# Patient Record
Sex: FEMALE | Race: WHITE | NOT HISPANIC OR LATINO
[De-identification: names, ages, dates, MRNs, and addresses within clinical notes are randomized per-mention and may not be internally consistent; named-entity substitution may affect disease eponyms.]

---

## 2018-09-21 ENCOUNTER — TRANSCRIPTION ENCOUNTER (OUTPATIENT)
Age: 83
End: 2018-09-21

## 2018-09-22 ENCOUNTER — RESULT REVIEW (OUTPATIENT)
Age: 83
End: 2018-09-22

## 2018-09-22 ENCOUNTER — INPATIENT (INPATIENT)
Facility: HOSPITAL | Age: 83
LOS: 4 days | Discharge: NURSING, REHAB OR LONG-TERM CARE | End: 2018-09-27
Attending: SURGERY | Admitting: SURGERY
Payer: MEDICARE

## 2018-09-22 VITALS — HEART RATE: 102 BPM | DIASTOLIC BLOOD PRESSURE: 80 MMHG | SYSTOLIC BLOOD PRESSURE: 131 MMHG | OXYGEN SATURATION: 100 %

## 2018-09-22 DIAGNOSIS — C49.A0 GASTROINTESTINAL STROMAL TUMOR, UNSPECIFIED SITE: Chronic | ICD-10-CM

## 2018-09-22 DIAGNOSIS — K63.1 PERFORATION OF INTESTINE (NONTRAUMATIC): ICD-10-CM

## 2018-09-22 LAB
ALBUMIN SERPL ELPH-MCNC: 2.7 G/DL — LOW (ref 3.3–5)
ALP SERPL-CCNC: 71 U/L — SIGNIFICANT CHANGE UP (ref 40–120)
ALT FLD-CCNC: 17 U/L — SIGNIFICANT CHANGE UP (ref 12–78)
ANION GAP SERPL CALC-SCNC: 10 MMOL/L — SIGNIFICANT CHANGE UP (ref 5–17)
APTT BLD: 46.7 SEC — HIGH (ref 27.5–37.4)
AST SERPL-CCNC: 18 U/L — SIGNIFICANT CHANGE UP (ref 15–37)
BASOPHILS # BLD AUTO: 0 K/UL — SIGNIFICANT CHANGE UP (ref 0–0.2)
BASOPHILS NFR BLD AUTO: 0 % — SIGNIFICANT CHANGE UP (ref 0–2)
BILIRUB SERPL-MCNC: 0.8 MG/DL — SIGNIFICANT CHANGE UP (ref 0.2–1.2)
BLD GP AB SCN SERPL QL: SIGNIFICANT CHANGE UP
BUN SERPL-MCNC: 30 MG/DL — HIGH (ref 7–23)
CALCIUM SERPL-MCNC: 7.8 MG/DL — LOW (ref 8.5–10.1)
CHLORIDE SERPL-SCNC: 104 MMOL/L — SIGNIFICANT CHANGE UP (ref 96–108)
CO2 SERPL-SCNC: 26 MMOL/L — SIGNIFICANT CHANGE UP (ref 22–31)
CREAT SERPL-MCNC: 1.1 MG/DL — SIGNIFICANT CHANGE UP (ref 0.5–1.3)
EOSINOPHIL # BLD AUTO: 0 K/UL — SIGNIFICANT CHANGE UP (ref 0–0.5)
EOSINOPHIL NFR BLD AUTO: 0 % — SIGNIFICANT CHANGE UP (ref 0–6)
GLUCOSE SERPL-MCNC: 120 MG/DL — HIGH (ref 70–99)
HCT VFR BLD CALC: 35.2 % — SIGNIFICANT CHANGE UP (ref 34.5–45)
HGB BLD-MCNC: 11.2 G/DL — LOW (ref 11.5–15.5)
INR BLD: 1.68 RATIO — HIGH (ref 0.88–1.16)
LACTATE SERPL-SCNC: 1.1 MMOL/L — SIGNIFICANT CHANGE UP (ref 0.7–2)
LACTATE SERPL-SCNC: 3 MMOL/L — HIGH (ref 0.7–2)
LIDOCAIN IGE QN: 42 U/L — LOW (ref 73–393)
LYMPHOCYTES # BLD AUTO: 0.26 K/UL — LOW (ref 1–3.3)
LYMPHOCYTES # BLD AUTO: 13 % — SIGNIFICANT CHANGE UP (ref 13–44)
MANUAL SMEAR VERIFICATION: SIGNIFICANT CHANGE UP
MCHC RBC-ENTMCNC: 30.8 PG — SIGNIFICANT CHANGE UP (ref 27–34)
MCHC RBC-ENTMCNC: 31.8 GM/DL — LOW (ref 32–36)
MCV RBC AUTO: 96.7 FL — SIGNIFICANT CHANGE UP (ref 80–100)
MONOCYTES # BLD AUTO: 0.18 K/UL — SIGNIFICANT CHANGE UP (ref 0–0.9)
MONOCYTES NFR BLD AUTO: 9 % — SIGNIFICANT CHANGE UP (ref 2–14)
NEUTROPHILS # BLD AUTO: 1.56 K/UL — LOW (ref 1.8–7.4)
NEUTROPHILS NFR BLD AUTO: 74 % — SIGNIFICANT CHANGE UP (ref 43–77)
NEUTS BAND # BLD: 4 % — SIGNIFICANT CHANGE UP (ref 0–8)
NRBC # BLD: 0 /100 — SIGNIFICANT CHANGE UP (ref 0–0)
NRBC # BLD: SIGNIFICANT CHANGE UP /100 WBCS (ref 0–0)
PLAT MORPH BLD: NORMAL — SIGNIFICANT CHANGE UP
PLATELET # BLD AUTO: 212 K/UL — SIGNIFICANT CHANGE UP (ref 150–400)
POTASSIUM SERPL-MCNC: 4.1 MMOL/L — SIGNIFICANT CHANGE UP (ref 3.5–5.3)
POTASSIUM SERPL-SCNC: 4.1 MMOL/L — SIGNIFICANT CHANGE UP (ref 3.5–5.3)
PROT SERPL-MCNC: 6.4 GM/DL — SIGNIFICANT CHANGE UP (ref 6–8.3)
PROTHROM AB SERPL-ACNC: 18.5 SEC — HIGH (ref 9.8–12.7)
RBC # BLD: 3.64 M/UL — LOW (ref 3.8–5.2)
RBC # FLD: 14 % — SIGNIFICANT CHANGE UP (ref 10.3–14.5)
RBC BLD AUTO: NORMAL — SIGNIFICANT CHANGE UP
SODIUM SERPL-SCNC: 140 MMOL/L — SIGNIFICANT CHANGE UP (ref 135–145)
SPHEROCYTES BLD QL SMEAR: SLIGHT — SIGNIFICANT CHANGE UP
WBC # BLD: 2 K/UL — LOW (ref 3.8–10.5)
WBC # FLD AUTO: 2 K/UL — LOW (ref 3.8–10.5)

## 2018-09-22 PROCEDURE — 44143 PARTIAL REMOVAL OF COLON: CPT | Mod: AS

## 2018-09-22 PROCEDURE — 88307 TISSUE EXAM BY PATHOLOGIST: CPT | Mod: 26

## 2018-09-22 PROCEDURE — 99285 EMERGENCY DEPT VISIT HI MDM: CPT

## 2018-09-22 PROCEDURE — 93010 ELECTROCARDIOGRAM REPORT: CPT

## 2018-09-22 PROCEDURE — 74177 CT ABD & PELVIS W/CONTRAST: CPT | Mod: 26

## 2018-09-22 PROCEDURE — 71045 X-RAY EXAM CHEST 1 VIEW: CPT | Mod: 26

## 2018-09-22 RX ORDER — SODIUM CHLORIDE 9 MG/ML
1000 INJECTION INTRAMUSCULAR; INTRAVENOUS; SUBCUTANEOUS ONCE
Qty: 0 | Refills: 0 | Status: COMPLETED | OUTPATIENT
Start: 2018-09-22 | End: 2018-09-22

## 2018-09-22 RX ORDER — MORPHINE SULFATE 50 MG/1
2 CAPSULE, EXTENDED RELEASE ORAL EVERY 4 HOURS
Qty: 0 | Refills: 0 | Status: DISCONTINUED | OUTPATIENT
Start: 2018-09-22 | End: 2018-09-22

## 2018-09-22 RX ORDER — SODIUM CHLORIDE 9 MG/ML
1000 INJECTION, SOLUTION INTRAVENOUS
Qty: 0 | Refills: 0 | Status: DISCONTINUED | OUTPATIENT
Start: 2018-09-22 | End: 2018-09-22

## 2018-09-22 RX ORDER — HEPARIN SODIUM 5000 [USP'U]/ML
5000 INJECTION INTRAVENOUS; SUBCUTANEOUS EVERY 12 HOURS
Qty: 0 | Refills: 0 | Status: DISCONTINUED | OUTPATIENT
Start: 2018-09-22 | End: 2018-09-22

## 2018-09-22 RX ORDER — ONDANSETRON 8 MG/1
4 TABLET, FILM COATED ORAL EVERY 6 HOURS
Qty: 0 | Refills: 0 | Status: DISCONTINUED | OUTPATIENT
Start: 2018-09-22 | End: 2018-09-22

## 2018-09-22 RX ORDER — PIPERACILLIN AND TAZOBACTAM 4; .5 G/20ML; G/20ML
3.38 INJECTION, POWDER, LYOPHILIZED, FOR SOLUTION INTRAVENOUS ONCE
Qty: 0 | Refills: 0 | Status: COMPLETED | OUTPATIENT
Start: 2018-09-22 | End: 2018-09-22

## 2018-09-22 RX ORDER — PANTOPRAZOLE SODIUM 20 MG/1
40 TABLET, DELAYED RELEASE ORAL DAILY
Qty: 0 | Refills: 0 | Status: DISCONTINUED | OUTPATIENT
Start: 2018-09-22 | End: 2018-09-22

## 2018-09-22 RX ORDER — METRONIDAZOLE 500 MG
500 TABLET ORAL ONCE
Qty: 0 | Refills: 0 | Status: COMPLETED | OUTPATIENT
Start: 2018-09-22 | End: 2018-09-22

## 2018-09-22 RX ORDER — PIPERACILLIN AND TAZOBACTAM 4; .5 G/20ML; G/20ML
3.38 INJECTION, POWDER, LYOPHILIZED, FOR SOLUTION INTRAVENOUS EVERY 8 HOURS
Qty: 0 | Refills: 0 | Status: DISCONTINUED | OUTPATIENT
Start: 2018-09-22 | End: 2018-09-22

## 2018-09-22 RX ADMIN — SODIUM CHLORIDE 100 MILLILITER(S): 9 INJECTION, SOLUTION INTRAVENOUS at 18:33

## 2018-09-22 RX ADMIN — SODIUM CHLORIDE 1000 MILLILITER(S): 9 INJECTION INTRAMUSCULAR; INTRAVENOUS; SUBCUTANEOUS at 14:30

## 2018-09-22 RX ADMIN — Medication 100 MILLIGRAM(S): at 17:18

## 2018-09-22 RX ADMIN — PIPERACILLIN AND TAZOBACTAM 200 GRAM(S): 4; .5 INJECTION, POWDER, LYOPHILIZED, FOR SOLUTION INTRAVENOUS at 18:33

## 2018-09-22 RX ADMIN — SODIUM CHLORIDE 1000 MILLILITER(S): 9 INJECTION INTRAMUSCULAR; INTRAVENOUS; SUBCUTANEOUS at 15:38

## 2018-09-22 RX ADMIN — SODIUM CHLORIDE 500 MILLILITER(S): 9 INJECTION INTRAMUSCULAR; INTRAVENOUS; SUBCUTANEOUS at 17:44

## 2018-09-22 NOTE — H&P ADULT - ATTENDING COMMENTS
I saw and examined the pt and discussed the tx plan with the House Staff. I agree with the exam and plan as documented in the above H&P.  Pt with abd pain and CT with free air c/w sigmoid perforation and in addition concerning for SB hyperenhancement - ? impending ischemia.  BP low on presentation, improved after IVFs, persistently tachycardic.  Recommended emergency ex-lap, poss bowel resection, possibe stoma creation, discussed details, R/B/A with pt and family, all questions answered.   Angela Garza MD

## 2018-09-22 NOTE — ED ADULT TRIAGE NOTE - CHIEF COMPLAINT QUOTE
brought by ems for abdominal pain . pt has been constipated for couple of days. pt took Miralax yesterday and now having diarrhea .

## 2018-09-22 NOTE — H&P ADULT - HISTORY OF PRESENT ILLNESS
85F with PMH RA, Depression/Anxiety, GIST tumor of stomach (s/p resection ~14yrs ago) presents c/o abdominal pain x 1 week. Patient states she has had mild abdominal pain x1 week, worsened this AM prompting arrival to the ER. Patient states pain is constant, diffuse, non radiating, no alleviating or worsening factors. Associated with nausea, denies vomiting. Admits to constipation x 1 week, which is not unusual for her. Last had a BM this morning, stool was watery. Denies hx of similar event.   Denies fever/chills, chest pain, sob, dizziness, urinary symptoms.

## 2018-09-22 NOTE — H&P ADULT - PROBLEM SELECTOR PLAN 1
Admit to surgery  NPO / IVF  Emergent OR for laparotomy   Zosyn  preop labs, T&S, coags  DVT ppx  Discussed with Dr. Garza

## 2018-09-22 NOTE — ED PROVIDER NOTE - MEDICAL DECISION MAKING DETAILS
pt with bowel perforation noted possibly from SBO evolution - pt with previous constipation 2 days ago, otherwise to admit for OR - pt needing bowel resection - discussed with family -will go to OR with Dr. Garza

## 2018-09-22 NOTE — ED PROVIDER NOTE - OBJECTIVE STATEMENT
85 year old female with PMH of of GIST tumor 85 year old female with PMH of of GIST tumor to stomach and otherwise with no significant medical hx presenting to ED due to 3 days of constipation - then had been using miralax and home with persistent abd pain now with diarrhea today 1 episode but severe pain on waking this AM. Currently pt states pain improved.

## 2018-09-22 NOTE — H&P ADULT - NSHPPHYSICALEXAM_GEN_ALL_CORE
General: Mild distress due to pain  Head: NC/AT  EENT: PERRLA. EOMI. Conjunctiva and sclera clear. Pharynx clear.  Neck: Supple.  Lungs: CTA B/l. Nonlabored Respirations  CV: +S1S2, tachycardic, regular   Abdomen: Distended, Quiet BS, 4 quadrant tenderness with guarding and rebound   Extremities: 2+ peripheral pulses b/l. Calf soft, nontender b/l. No pedal edema.   Neuro: A&Ox3.

## 2018-09-23 LAB
ALBUMIN SERPL ELPH-MCNC: 2.3 G/DL — LOW (ref 3.3–5)
ALP SERPL-CCNC: 61 U/L — SIGNIFICANT CHANGE UP (ref 40–120)
ALT FLD-CCNC: 17 U/L — SIGNIFICANT CHANGE UP (ref 12–78)
ANION GAP SERPL CALC-SCNC: 7 MMOL/L — SIGNIFICANT CHANGE UP (ref 5–17)
ANION GAP SERPL CALC-SCNC: 9 MMOL/L — SIGNIFICANT CHANGE UP (ref 5–17)
AST SERPL-CCNC: 16 U/L — SIGNIFICANT CHANGE UP (ref 15–37)
BASE EXCESS BLDA CALC-SCNC: -2.3 MMOL/L — LOW (ref -2–2)
BASE EXCESS BLDA CALC-SCNC: 0.2 MMOL/L — SIGNIFICANT CHANGE UP (ref -2–2)
BASOPHILS # BLD AUTO: 0.03 K/UL — SIGNIFICANT CHANGE UP (ref 0–0.2)
BASOPHILS NFR BLD AUTO: 0.8 % — SIGNIFICANT CHANGE UP (ref 0–2)
BILIRUB SERPL-MCNC: 0.9 MG/DL — SIGNIFICANT CHANGE UP (ref 0.2–1.2)
BLOOD GAS COMMENTS: SIGNIFICANT CHANGE UP
BLOOD GAS SOURCE: SIGNIFICANT CHANGE UP
BLOOD GAS SOURCE: SIGNIFICANT CHANGE UP
BUN SERPL-MCNC: 19 MG/DL — SIGNIFICANT CHANGE UP (ref 7–23)
BUN SERPL-MCNC: 24 MG/DL — HIGH (ref 7–23)
CALCIUM SERPL-MCNC: 6.7 MG/DL — LOW (ref 8.5–10.1)
CALCIUM SERPL-MCNC: 7 MG/DL — LOW (ref 8.5–10.1)
CHLORIDE SERPL-SCNC: 107 MMOL/L — SIGNIFICANT CHANGE UP (ref 96–108)
CHLORIDE SERPL-SCNC: 111 MMOL/L — HIGH (ref 96–108)
CO2 SERPL-SCNC: 25 MMOL/L — SIGNIFICANT CHANGE UP (ref 22–31)
CO2 SERPL-SCNC: 25 MMOL/L — SIGNIFICANT CHANGE UP (ref 22–31)
CREAT SERPL-MCNC: 0.62 MG/DL — SIGNIFICANT CHANGE UP (ref 0.5–1.3)
CREAT SERPL-MCNC: 0.65 MG/DL — SIGNIFICANT CHANGE UP (ref 0.5–1.3)
EOSINOPHIL # BLD AUTO: 0 K/UL — SIGNIFICANT CHANGE UP (ref 0–0.5)
EOSINOPHIL NFR BLD AUTO: 0 % — SIGNIFICANT CHANGE UP (ref 0–6)
GLUCOSE BLDC GLUCOMTR-MCNC: 107 MG/DL — HIGH (ref 70–99)
GLUCOSE BLDC GLUCOMTR-MCNC: 118 MG/DL — HIGH (ref 70–99)
GLUCOSE BLDC GLUCOMTR-MCNC: 126 MG/DL — HIGH (ref 70–99)
GLUCOSE SERPL-MCNC: 108 MG/DL — HIGH (ref 70–99)
GLUCOSE SERPL-MCNC: 126 MG/DL — HIGH (ref 70–99)
GRAM STN FLD: SIGNIFICANT CHANGE UP
GRAM STN FLD: SIGNIFICANT CHANGE UP
HCO3 BLDA-SCNC: 23 MMOL/L — SIGNIFICANT CHANGE UP (ref 21–29)
HCO3 BLDA-SCNC: 24 MMOL/L — SIGNIFICANT CHANGE UP (ref 21–29)
HCT VFR BLD CALC: 31.5 % — LOW (ref 34.5–45)
HCT VFR BLD CALC: 32.6 % — LOW (ref 34.5–45)
HGB BLD-MCNC: 10 G/DL — LOW (ref 11.5–15.5)
HGB BLD-MCNC: 10.5 G/DL — LOW (ref 11.5–15.5)
HOROWITZ INDEX BLDA+IHG-RTO: 0.3 — SIGNIFICANT CHANGE UP
HOROWITZ INDEX BLDA+IHG-RTO: 30 — SIGNIFICANT CHANGE UP
IMM GRANULOCYTES NFR BLD AUTO: 0.5 % — SIGNIFICANT CHANGE UP (ref 0–1.5)
INR BLD: 1.28 RATIO — HIGH (ref 0.88–1.16)
LACTATE SERPL-SCNC: 0.8 MMOL/L — SIGNIFICANT CHANGE UP (ref 0.7–2)
LYMPHOCYTES # BLD AUTO: 0.46 K/UL — LOW (ref 1–3.3)
LYMPHOCYTES # BLD AUTO: 12 % — LOW (ref 13–44)
MAGNESIUM SERPL-MCNC: 1.8 MG/DL — SIGNIFICANT CHANGE UP (ref 1.6–2.6)
MCHC RBC-ENTMCNC: 31.1 PG — SIGNIFICANT CHANGE UP (ref 27–34)
MCHC RBC-ENTMCNC: 31.4 PG — SIGNIFICANT CHANGE UP (ref 27–34)
MCHC RBC-ENTMCNC: 31.7 GM/DL — LOW (ref 32–36)
MCHC RBC-ENTMCNC: 32.2 GM/DL — SIGNIFICANT CHANGE UP (ref 32–36)
MCV RBC AUTO: 97.6 FL — SIGNIFICANT CHANGE UP (ref 80–100)
MCV RBC AUTO: 97.8 FL — SIGNIFICANT CHANGE UP (ref 80–100)
MONOCYTES # BLD AUTO: 0.14 K/UL — SIGNIFICANT CHANGE UP (ref 0–0.9)
MONOCYTES NFR BLD AUTO: 3.7 % — SIGNIFICANT CHANGE UP (ref 2–14)
NEUTROPHILS # BLD AUTO: 3.17 K/UL — SIGNIFICANT CHANGE UP (ref 1.8–7.4)
NEUTROPHILS NFR BLD AUTO: 83 % — HIGH (ref 43–77)
NRBC # BLD: 0 /100 WBCS — SIGNIFICANT CHANGE UP (ref 0–0)
NRBC # BLD: 0 /100 WBCS — SIGNIFICANT CHANGE UP (ref 0–0)
PCO2 BLDA: 35 MMHG — SIGNIFICANT CHANGE UP (ref 32–46)
PCO2 BLDA: 48 MMHG — HIGH (ref 32–46)
PH BLD: 7.31 — LOW (ref 7.35–7.45)
PH BLD: 7.44 — SIGNIFICANT CHANGE UP (ref 7.35–7.45)
PHOSPHATE SERPL-MCNC: 2.5 MG/DL — SIGNIFICANT CHANGE UP (ref 2.5–4.5)
PLATELET # BLD AUTO: 101 K/UL — LOW (ref 150–400)
PLATELET # BLD AUTO: 170 K/UL — SIGNIFICANT CHANGE UP (ref 150–400)
PO2 BLDA: 112 MMHG — HIGH (ref 74–108)
PO2 BLDA: 138 MMHG — HIGH (ref 74–108)
POTASSIUM SERPL-MCNC: 3.6 MMOL/L — SIGNIFICANT CHANGE UP (ref 3.5–5.3)
POTASSIUM SERPL-MCNC: 4.7 MMOL/L — SIGNIFICANT CHANGE UP (ref 3.5–5.3)
POTASSIUM SERPL-SCNC: 3.6 MMOL/L — SIGNIFICANT CHANGE UP (ref 3.5–5.3)
POTASSIUM SERPL-SCNC: 4.7 MMOL/L — SIGNIFICANT CHANGE UP (ref 3.5–5.3)
PROT SERPL-MCNC: 5.5 GM/DL — LOW (ref 6–8.3)
PROTHROM AB SERPL-ACNC: 14 SEC — HIGH (ref 9.8–12.7)
RBC # BLD: 3.22 M/UL — LOW (ref 3.8–5.2)
RBC # BLD: 3.34 M/UL — LOW (ref 3.8–5.2)
RBC # FLD: 13.9 % — SIGNIFICANT CHANGE UP (ref 10.3–14.5)
RBC # FLD: 14.2 % — SIGNIFICANT CHANGE UP (ref 10.3–14.5)
SAO2 % BLDA: 98 % — HIGH (ref 92–96)
SAO2 % BLDA: 99 % — HIGH (ref 92–96)
SODIUM SERPL-SCNC: 141 MMOL/L — SIGNIFICANT CHANGE UP (ref 135–145)
SODIUM SERPL-SCNC: 143 MMOL/L — SIGNIFICANT CHANGE UP (ref 135–145)
SPECIMEN SOURCE: SIGNIFICANT CHANGE UP
SPECIMEN SOURCE: SIGNIFICANT CHANGE UP
WBC # BLD: 3.82 K/UL — SIGNIFICANT CHANGE UP (ref 3.8–10.5)
WBC # BLD: 7.57 K/UL — SIGNIFICANT CHANGE UP (ref 3.8–10.5)
WBC # FLD AUTO: 3.82 K/UL — SIGNIFICANT CHANGE UP (ref 3.8–10.5)
WBC # FLD AUTO: 7.57 K/UL — SIGNIFICANT CHANGE UP (ref 3.8–10.5)

## 2018-09-23 PROCEDURE — 71045 X-RAY EXAM CHEST 1 VIEW: CPT | Mod: 26

## 2018-09-23 PROCEDURE — 99291 CRITICAL CARE FIRST HOUR: CPT

## 2018-09-23 RX ORDER — CHLORHEXIDINE GLUCONATE 213 G/1000ML
1 SOLUTION TOPICAL DAILY
Qty: 0 | Refills: 0 | Status: DISCONTINUED | OUTPATIENT
Start: 2018-09-23 | End: 2018-09-23

## 2018-09-23 RX ORDER — SODIUM CHLORIDE 9 MG/ML
1000 INJECTION, SOLUTION INTRAVENOUS
Qty: 0 | Refills: 0 | Status: DISCONTINUED | OUTPATIENT
Start: 2018-09-23 | End: 2018-09-24

## 2018-09-23 RX ORDER — VANCOMYCIN HCL 1 G
750 VIAL (EA) INTRAVENOUS EVERY 12 HOURS
Qty: 0 | Refills: 0 | Status: DISCONTINUED | OUTPATIENT
Start: 2018-09-23 | End: 2018-09-25

## 2018-09-23 RX ORDER — PIPERACILLIN AND TAZOBACTAM 4; .5 G/20ML; G/20ML
3.38 INJECTION, POWDER, LYOPHILIZED, FOR SOLUTION INTRAVENOUS EVERY 8 HOURS
Qty: 0 | Refills: 0 | Status: DISCONTINUED | OUTPATIENT
Start: 2018-09-23 | End: 2018-09-27

## 2018-09-23 RX ORDER — CHLORHEXIDINE GLUCONATE 213 G/1000ML
15 SOLUTION TOPICAL
Qty: 0 | Refills: 0 | Status: DISCONTINUED | OUTPATIENT
Start: 2018-09-23 | End: 2018-09-23

## 2018-09-23 RX ORDER — DEXMEDETOMIDINE HYDROCHLORIDE IN 0.9% SODIUM CHLORIDE 4 UG/ML
0.3 INJECTION INTRAVENOUS
Qty: 200 | Refills: 0 | Status: DISCONTINUED | OUTPATIENT
Start: 2018-09-23 | End: 2018-09-23

## 2018-09-23 RX ORDER — CHLORHEXIDINE GLUCONATE 213 G/1000ML
1 SOLUTION TOPICAL
Qty: 0 | Refills: 0 | Status: DISCONTINUED | OUTPATIENT
Start: 2018-09-23 | End: 2018-09-25

## 2018-09-23 RX ORDER — NOREPINEPHRINE BITARTRATE/D5W 8 MG/250ML
0.05 PLASTIC BAG, INJECTION (ML) INTRAVENOUS
Qty: 8 | Refills: 0 | Status: DISCONTINUED | OUTPATIENT
Start: 2018-09-23 | End: 2018-09-24

## 2018-09-23 RX ORDER — VANCOMYCIN HCL 1 G
750 VIAL (EA) INTRAVENOUS ONCE
Qty: 0 | Refills: 0 | Status: COMPLETED | OUTPATIENT
Start: 2018-09-23 | End: 2018-09-23

## 2018-09-23 RX ORDER — FLUCONAZOLE 150 MG/1
TABLET ORAL
Qty: 0 | Refills: 0 | Status: DISCONTINUED | OUTPATIENT
Start: 2018-09-23 | End: 2018-09-26

## 2018-09-23 RX ORDER — FLUCONAZOLE 150 MG/1
100 TABLET ORAL ONCE
Qty: 0 | Refills: 0 | Status: COMPLETED | OUTPATIENT
Start: 2018-09-23 | End: 2018-09-23

## 2018-09-23 RX ORDER — PANTOPRAZOLE SODIUM 20 MG/1
40 TABLET, DELAYED RELEASE ORAL DAILY
Qty: 0 | Refills: 0 | Status: DISCONTINUED | OUTPATIENT
Start: 2018-09-23 | End: 2018-09-23

## 2018-09-23 RX ORDER — INFLUENZA VIRUS VACCINE 15; 15; 15; 15 UG/.5ML; UG/.5ML; UG/.5ML; UG/.5ML
0.5 SUSPENSION INTRAMUSCULAR ONCE
Qty: 0 | Refills: 0 | Status: DISCONTINUED | OUTPATIENT
Start: 2018-09-23 | End: 2018-09-27

## 2018-09-23 RX ORDER — FLUCONAZOLE 150 MG/1
100 TABLET ORAL EVERY 24 HOURS
Qty: 0 | Refills: 0 | Status: DISCONTINUED | OUTPATIENT
Start: 2018-09-24 | End: 2018-09-26

## 2018-09-23 RX ORDER — MORPHINE SULFATE 50 MG/1
2 CAPSULE, EXTENDED RELEASE ORAL EVERY 4 HOURS
Qty: 0 | Refills: 0 | Status: DISCONTINUED | OUTPATIENT
Start: 2018-09-23 | End: 2018-09-24

## 2018-09-23 RX ORDER — HEPARIN SODIUM 5000 [USP'U]/ML
5000 INJECTION INTRAVENOUS; SUBCUTANEOUS EVERY 12 HOURS
Qty: 0 | Refills: 0 | Status: DISCONTINUED | OUTPATIENT
Start: 2018-09-23 | End: 2018-09-23

## 2018-09-23 RX ORDER — SODIUM CHLORIDE 9 MG/ML
500 INJECTION, SOLUTION INTRAVENOUS ONCE
Qty: 0 | Refills: 0 | Status: COMPLETED | OUTPATIENT
Start: 2018-09-23 | End: 2018-09-23

## 2018-09-23 RX ADMIN — CHLORHEXIDINE GLUCONATE 15 MILLILITER(S): 213 SOLUTION TOPICAL at 05:47

## 2018-09-23 RX ADMIN — PIPERACILLIN AND TAZOBACTAM 25 GRAM(S): 4; .5 INJECTION, POWDER, LYOPHILIZED, FOR SOLUTION INTRAVENOUS at 13:21

## 2018-09-23 RX ADMIN — DEXMEDETOMIDINE HYDROCHLORIDE IN 0.9% SODIUM CHLORIDE 3.06 MICROGRAM(S)/KG/HR: 4 INJECTION INTRAVENOUS at 06:09

## 2018-09-23 RX ADMIN — FLUCONAZOLE 50 MILLIGRAM(S): 150 TABLET ORAL at 09:05

## 2018-09-23 RX ADMIN — CHLORHEXIDINE GLUCONATE 1 APPLICATION(S): 213 SOLUTION TOPICAL at 11:47

## 2018-09-23 RX ADMIN — PIPERACILLIN AND TAZOBACTAM 25 GRAM(S): 4; .5 INJECTION, POWDER, LYOPHILIZED, FOR SOLUTION INTRAVENOUS at 22:05

## 2018-09-23 RX ADMIN — SODIUM CHLORIDE 100 MILLILITER(S): 9 INJECTION, SOLUTION INTRAVENOUS at 13:20

## 2018-09-23 RX ADMIN — SODIUM CHLORIDE 100 MILLILITER(S): 9 INJECTION, SOLUTION INTRAVENOUS at 00:30

## 2018-09-23 RX ADMIN — SODIUM CHLORIDE 500 MILLILITER(S): 9 INJECTION, SOLUTION INTRAVENOUS at 11:30

## 2018-09-23 RX ADMIN — PIPERACILLIN AND TAZOBACTAM 25 GRAM(S): 4; .5 INJECTION, POWDER, LYOPHILIZED, FOR SOLUTION INTRAVENOUS at 05:00

## 2018-09-23 RX ADMIN — SODIUM CHLORIDE 500 MILLILITER(S): 9 INJECTION, SOLUTION INTRAVENOUS at 08:30

## 2018-09-23 RX ADMIN — Medication 250 MILLIGRAM(S): at 02:00

## 2018-09-23 RX ADMIN — SODIUM CHLORIDE 100 MILLILITER(S): 9 INJECTION, SOLUTION INTRAVENOUS at 23:34

## 2018-09-23 RX ADMIN — Medication 3.83 MICROGRAM(S)/KG/MIN: at 10:36

## 2018-09-23 RX ADMIN — Medication 250 MILLIGRAM(S): at 17:27

## 2018-09-23 RX ADMIN — PANTOPRAZOLE SODIUM 40 MILLIGRAM(S): 20 TABLET, DELAYED RELEASE ORAL at 11:28

## 2018-09-23 NOTE — PROGRESS NOTE ADULT - ASSESSMENT
85F with PMH RA, Depression/Anxiety, GIST tumor of stomach (s/p resection ~14yrs ago) arrived with perforated sigmoid colon likely 2/2 diverticulitis, now s/p OR for ex-lap with abdominal washout, sigmoid resection and Silva's procedure.      Neuro: Intubated and sedated, currently on Precedex for sedation, tolerating weaning trial well.  Will wean to extubate.  Morphine 2mg PRN for pain.   CV: Hypotensive, possibly vasoplegic requiring IVF and now on levophed.  Will give LR bolus 500cc.  Reassess and monitor urine output closely.   Pulm: Intubated for OR, weaning today.  Will extubate this morning if passes sbt.  repeat abg after sbt is ABG  pH, Blood: 7.44  /  pCO2: 35    /  pO2: 138   / HCO3: 23    / Base Excess: 0.2   /  SaO2: 99; ok to extubate  GI: NPO for now.  Follow up surgery, ostomy appears well vitalized.  NGT in place.    Renal/Metabolic: Monitor i and o closely; follow up creatinine.    ID: Zosyn and fluconazole for peritonitis and perforated diverticulitis.  Continue to monitor.    Heme: H/H stable, leukopenia, continue to trend at this time.    Endo: No acute issues   Dispo: Remains critically ill, weaning ventilator currently and on vasopressors for vasoplegia likely 2/2 septic shock.      critical care time spent 45 minutes.

## 2018-09-23 NOTE — BRIEF OPERATIVE NOTE - PROCEDURE
<<-----Click on this checkbox to enter Procedure Lebron's procedure  09/23/2018    Active  GRIFFIN  Sigmoid resection  09/23/2018    Active  GRIFFIN  Peritoneal lavage  09/23/2018    Active  GRIFFIN

## 2018-09-23 NOTE — PROVIDER CONTACT NOTE (CRITICAL VALUE NOTIFICATION) - TEST AND RESULT REPORTED:
body fluids collected 9/22- rare gram negative rods per oil power field, rare positive cocci in pairs per oil power fied, moderate poly morphonuclear leukocytes seen per low power field; body fluids collected 9/22- few gram positive rods per oil power field, moderate gram positive cocci in pairs per oil power field, moderate gram negative rods per oil power field; numerous polymorphnuclear leukocytes seen per lower power field

## 2018-09-23 NOTE — PROGRESS NOTE ADULT - SUBJECTIVE AND OBJECTIVE BOX
INTERVAL HPI/OVERNIGHT EVENTS:      85F with PMH RA, Depression/Anxiety, GIST tumor of stomach (s/p resection ~14yrs ago) arrived with perforated sigmoid colon likely 2/2 diverticulitis, now s/p OR for ex-lap with abdominal washout, sigmoid resection and Silva's procedure.      24 hour events: Noted to be hypotensive this AM with decreased urine output, started on IV pressors.  Weaning well this morning.      CENTRAL LINE: [ ] YES [ x] NO  LOCATION:   DATE INSERTED:  REMOVE: [ ] YES [ ] NO  EXPLAIN:    MAYS: [ x] YES [ ] NO    DATE INSERTED: 9/23/18  REMOVE:  [ ] YES [ ] NO  EXPLAIN:    A-LINE:  [ ] YES [x ] NO  LOCATION:   DATE INSERTED:  REMOVE:  [ ] YES [ ] NO  EXPLAIN:    REVIEW OF SYSTEMS: [x] Unable to obtain because: intubated on SBT    ICU Vital Signs Last 24 Hrs  T(C): 36.6 (23 Sep 2018 07:40), Max: 37.3 (23 Sep 2018 00:15)  T(F): 97.9 (23 Sep 2018 07:40), Max: 99.2 (23 Sep 2018 00:15)  HR: 91 (23 Sep 2018 11:00) (91 - 118)  BP: 87/60 (23 Sep 2018 11:00) (86/61 - 131/67)  BP(mean): 66 (23 Sep 2018 11:00) (60 - 86)  ABP: --  ABP(mean): --  RR: 10 (23 Sep 2018 11:00) (10 - 21)  SpO2: 100% (23 Sep 2018 11:00) (98% - 100%)      ABG - ( 23 Sep 2018 11:00 )  pH, Arterial: x     pH, Blood: 7.44  /  pCO2: 35    /  pO2: 138   / HCO3: 23    / Base Excess: 0.2   /  SaO2: 99        I&O's Detail    22 Sep 2018 07:01  -  23 Sep 2018 07:00  --------------------------------------------------------  IN:    dexmedetomidine Infusion: 6 mL    lactated ringers.: 800 mL    Solution: 250 mL    Solution: 100 mL  Total IN: 1156 mL    OUT:    Indwelling Catheter - Urethral: 380 mL    Nasoenteral Tube: 50 mL  Total OUT: 430 mL    Total NET: 726 mL      23 Sep 2018 07:01  -  23 Sep 2018 11:20  --------------------------------------------------------  IN:    dexmedetomidine Infusion: 3.1 mL    Lactated Ringers IV Bolus: 500 mL    lactated ringers.: 400 mL    norepinephrine Infusion: 7.5 mL    Solution: 50 mL  Total IN: 960.6 mL    OUT:    Indwelling Catheter - Urethral: 125 mL  Total OUT: 125 mL    Total NET: 835.6 mL    Mode: CPAP with PS  FiO2: 30  PEEP: 5  PS: 5  MAP: 7  PIP: 11    CAPILLARY BLOOD GLUCOSE      POCT Blood Glucose.: 107 mg/dL (23 Sep 2018 07:33)  POCT Blood Glucose.: 126 mg/dL (23 Sep 2018 00:09)      MEDICATIONS  NEURO  Meds: dexmedetomidine Infusion 0.3 MICROgram(s)/kG/Hr (3.06 mL/Hr) IV Continuous <Continuous>  morphine  - Injectable 2 milliGRAM(s) IV Push every 4 hours PRN Moderate Pain (4 - 6)    RESPIRATORY  ABG - ( 23 Sep 2018 11:00 )  pH: x     /  pCO2: 35    /  pO2: 138   / HCO3: 23    / Base Excess: 0.2   /  SaO2: 99      Lactate: x        Meds:   CARDIOVASCULAR  Meds: norepinephrine Infusion 0.05 MICROgram(s)/kG/Min (3.825 mL/Hr) IV Continuous <Continuous>    GI/NUTRITION  Meds: pantoprazole  Injectable 40 milliGRAM(s) IV Push daily    GENITOURINARY  Meds: lactated ringers Bolus 500 milliLiter(s) IV Bolus once  lactated ringers. 1000 milliLiter(s) IV Continuous <Continuous>    HEMATOLOGIC  Meds: heparin  Injectable 5000 Unit(s) SubCutaneous every 12 hours    [x] VTE Prophylaxis  INFECTIOUS DISEASES  Meds: fluconAZOLE IVPB      piperacillin/tazobactam IVPB. 3.375 Gram(s) IV Intermittent every 8 hours    ENDOCRINE  CAPILLARY BLOOD GLUCOSE      POCT Blood Glucose.: 107 mg/dL (23 Sep 2018 07:33)  POCT Blood Glucose.: 126 mg/dL (23 Sep 2018 00:09)    Meds:   OTHER MEDICATIONS:  chlorhexidine 0.12% Liquid 15 milliLiter(s) Swish and Spit two times a day  chlorhexidine 2% Cloths 1 Application(s) Topical daily  :    PHYSICAL EXAM:    GENERAL: NAD, intubated weaning well  HEAD:  Atraumatic, Normocephalic  NECK: Supple, No JVD, Normal thyroid  CHEST/LUNG: Clear to auscultation bilaterally; No rales, rhonchi, wheezing, or rubs  HEART: Regular rate and rhythm; No murmurs, rubs, or gallops  ABDOMEN: Soft, tender to palpation; colostomy in place, wound c/d/i  EXTREMITIES:  2+ Peripheral Pulses, No clubbing, cyanosis, or edema  NERVOUS SYSTEM:  Alert & Oriented, moving all extremities, no focal neuro deficits.    LABS:                        10.0   3.82  )-----------( 170      ( 23 Sep 2018 01:07 )             31.5      09-23    143  |  111<H>  |  24<H>  ----------------------------<  126<H>  3.6   |  25  |  0.65    Ca    6.7<L>      23 Sep 2018 01:07  Phos  2.5     09-23  Mg     1.8     09-23    TPro  5.5<L>  /  Alb  2.3<L>  /  TBili  0.9  /  DBili  x   /  AST  16  /  ALT  17  /  AlkPhos  61  09-23    PT/INR - ( 23 Sep 2018 01:06 )   PT: 14.0 sec;   INR: 1.28 ratio         PTT - ( 22 Sep 2018 17:15 )  PTT:46.7 sec    RADIOLOGY & ADDITIONAL STUDIES:

## 2018-09-23 NOTE — PROGRESS NOTE ADULT - SUBJECTIVE AND OBJECTIVE BOX
Surgery NP note  Patient seen and examined bedside in CCU, intubated and sedated, NO pressor support  No acute events overnight     T(F): 98.1 (09-23-18 @ 05:51), Max: 99.2 (09-23-18 @ 00:15)  HR: 103 (09-23-18 @ 06:30) (97 - 118)  BP: 114/66 (09-23-18 @ 06:30) (88/69 - 131/67)  RR: 19 (09-23-18 @ 06:30) (12 - 21)  SpO2: 100% (09-23-18 @ 06:30) (98% - 100%)  Wt(kg): --  CAPILLARY BLOOD GLUCOSE      POCT Blood Glucose.: 126 mg/dL (23 Sep 2018 00:09)      PHYSICAL EXAM:  General: NAD,frail   Neuro: Sedated  HEENT: NCAT, EOMI, conjunctiva clear, NGT with bilious output 50ml/in last 6 hrs  CV: +S1+S2 regular rate and rhythm  Lung: clear to ausculation bilaterally, respirations nonlabored, good inspiratory effort  Abdomen: soft, No Distension. Normal active BS Ostomy pink and viable, no air ni bag, midline incision with DSD - minimal s/s drainage  Extremities: no pedal edema noted   : Mata with clear yellow urine 380ml/last 6 hours  LABS:                        10.0   3.82  )-----------( 170      ( 23 Sep 2018 01:07 )             31.5     09-23    143  |  111<H>  |  24<H>  ----------------------------<  126<H>  3.6   |  25  |  0.65    Ca    6.7<L>      23 Sep 2018 01:07  Phos  2.5     09-23  Mg     1.8     09-23    TPro  5.5<L>  /  Alb  2.3<L>  /  TBili  0.9  /  DBili  x   /  AST  16  /  ALT  17  /  AlkPhos  61  09-23    LIVER FUNCTIONS - ( 23 Sep 2018 01:07 )  Alb: 2.3 g/dL / Pro: 5.5 gm/dL / ALK PHOS: 61 U/L / ALT: 17 U/L / AST: 16 U/L / GGT: x           PT/INR - ( 23 Sep 2018 01:06 )   PT: 14.0 sec;   INR: 1.28 ratio         PTT - ( 22 Sep 2018 17:15 )  PTT:46.7 sec  I&O's Detail    22 Sep 2018 07:01  -  23 Sep 2018 06:51  --------------------------------------------------------  IN:    dexmedetomidine Infusion: 3 mL    lactated ringers.: 700 mL    Solution: 250 mL    Solution: 100 mL  Total IN: 1053 mL    OUT:    Indwelling Catheter - Urethral: 380 mL    Nasoenteral Tube: 50 mL  Total OUT: 430 mL    Total NET: 623 mL            Impression: 85y Female admitted with Abdominal pain S/P Exploratory Laparotomy Sigmoid resection with Hartmans procedure, Peritoneal lavage   secondary to Sigmoid perforation, Diffuse peritonitis   PMH Rheumatoid arthritis      Plan:  - Will remove Packing later today   - Continue Antibiotics, f/u OR culture  - Continue NPO/IVF, NGT to Suction, Mata  - Continue VTE prophylaxis with SQ heparin to start this am  - Continue CCU management  - f/u AM labs  - will discuss with surgical attending for Recommendations Surgery NP note  Patient seen and examined bedside in CCU, intubated and sedated, NO pressor support  No acute events overnight     T(F): 98.1 (09-23-18 @ 05:51), Max: 99.2 (09-23-18 @ 00:15)  HR: 103 (09-23-18 @ 06:30) (97 - 118)  BP: 114/66 (09-23-18 @ 06:30) (88/69 - 131/67)  RR: 19 (09-23-18 @ 06:30) (12 - 21)  SpO2: 100% (09-23-18 @ 06:30) (98% - 100%)  Wt(kg): --  CAPILLARY BLOOD GLUCOSE      POCT Blood Glucose.: 126 mg/dL (23 Sep 2018 00:09)      PHYSICAL EXAM:  General: NAD,frail   Neuro: Sedated  HEENT: NCAT, EOMI, conjunctiva clear, NGT with bilious output 50ml/in last 6 hrs  CV: +S1+S2 regular rate and rhythm  Lung: clear to ausculation bilaterally, respirations nonlabored, good inspiratory effort  Abdomen: soft, No Distension. Normal active BS Ostomy pink and viable, no air ni bag, midline incision with DSD - minimal s/s drainage  Extremities: no pedal edema noted   : Mata with clear yellow urine 380ml/last 6 hours  LABS:                        10.0   3.82  )-----------( 170      ( 23 Sep 2018 01:07 )             31.5     09-23    143  |  111<H>  |  24<H>  ----------------------------<  126<H>  3.6   |  25  |  0.65    Ca    6.7<L>      23 Sep 2018 01:07  Phos  2.5     09-23  Mg     1.8     09-23    TPro  5.5<L>  /  Alb  2.3<L>  /  TBili  0.9  /  DBili  x   /  AST  16  /  ALT  17  /  AlkPhos  61  09-23    LIVER FUNCTIONS - ( 23 Sep 2018 01:07 )  Alb: 2.3 g/dL / Pro: 5.5 gm/dL / ALK PHOS: 61 U/L / ALT: 17 U/L / AST: 16 U/L / GGT: x           PT/INR - ( 23 Sep 2018 01:06 )   PT: 14.0 sec;   INR: 1.28 ratio         PTT - ( 22 Sep 2018 17:15 )  PTT:46.7 sec  I&O's Detail    22 Sep 2018 07:01  -  23 Sep 2018 06:51  --------------------------------------------------------  IN:    dexmedetomidine Infusion: 3 mL    lactated ringers.: 700 mL    Solution: 250 mL    Solution: 100 mL  Total IN: 1053 mL    OUT:    Indwelling Catheter - Urethral: 380 mL    Nasoenteral Tube: 50 mL  Total OUT: 430 mL    Total NET: 623 mL            Impression: 85y Female admitted with Abdominal pain S/P Exploratory Laparotomy Sigmoid resection with Hartmans procedure, Peritoneal lavage   secondary to Sigmoid perforation (likely perforated diverticulitis), Diffuse purulent peritonitis   PMH Rheumatoid arthritis      Plan:  - Will remove Packing later today   - Continue Antibiotics, f/u OR culture  - Continue NPO/IVF, NGT to Suction, Mata  - Continue VTE prophylaxis with SQ heparin to start this am  - Continue CCU management  - f/u AM labs  - will discuss with surgical attending for Recommendations

## 2018-09-23 NOTE — CHART NOTE - NSCHARTNOTEFT_GEN_A_CORE
HPI:  85F with PMH RA, Depression/Anxiety, GIST tumor of stomach (s/p resection ~14yrs ago) presents c/o abdominal pain x 1 week. Patient states she has had mild abdominal pain x1 week, worsened this AM prompting arrival to the ER. Patient states pain is constant, diffuse, non radiating, no alleviating or worsening factors. Associated with nausea, denies vomiting. Admits to constipation x 1 week, which is not unusual for her. Last had a BM this morning, stool was watery. Denies hx of similar event.   Denies fever/chills, chest pain, sob, dizziness, urinary symptoms. (22 Sep 2018 18:11) Ct Scan revealed  Free intraperitoneal air and fluid is identified consistent   with bowel perforation. In light of pathologic thickening of the mid sigmoid colon and stranding of the pelvic mesentery the possibility for sigmoid   colonic perforation is raised; however, diffuse small bowel wall thickening with extensive mesenteric edema as well as a segment of small bowel within   the left lower quadrant demonstrating less robust enhancement raises the possibility for small bowel ischemia with secondary perforation.    Patient received postoperatively s/p exploratory laparotomy, Lebron's resection procedure and lysis of adhesions. Patient received 2U PRBCs. bp 130/70    Vital Signs Last 24 Hrs  T(C): 37.3 (23 Sep 2018 00:15), Max: 37.3 (23 Sep 2018 00:15)  T(F): 99.2 (23 Sep 2018 00:15), Max: 99.2 (23 Sep 2018 00:15)  HR: 110 (23 Sep 2018 01:00) (103 - 118)  BP: 117/59 (23 Sep 2018 01:00) (92/66 - 123/76)  BP(mean): 73 (23 Sep 2018 01:00) (73 - 86)  RR: 12 (23 Sep 2018 01:00) (12 - 18)  SpO2: 99% (23 Sep 2018 01:00) (98% -     Gen sedated, thin appearing female younger than stated age  EENT: orally intubated ETT 7.5 at 21 cm lip. Bilateral air movement not appreciated. Chest Xray revealed ETT at [place] retracted to 19 cm  Lungs bilateral air entry  Cardiac S1/S2, systolic murmus 2/5 at 2nd RICS  Abdomen: colostomy site intact scant bleeding  : Mata  400 cc clear yellow urine  Ext no edema    Labs Drawn Lab Results:  CBC Full  -  ( 23 Sep 2018 01:07 )  WBC Count : 3.82 K/uL  Hemoglobin : 10.0 g/dL  Hematocrit : 31.5 %  Platelet Count - Automated : 170 K/uL  Mean Cell Volume : 97.8 fl  Mean Cell Hemoglobin : 31.1 pg  Mean Cell Hemoglobin Concentration : 31.7 gm/dL  Auto Neutrophil # : 3.17 K/uL  Auto Lymphocyte # : 0.46 K/uL  Auto Monocyte # : 0.14 K/uL  Auto Eosinophil # : 0.00 K/uL  Auto Basophil # : 0.03 K/uL  Auto Neutrophil % : 83.0 %  Auto Lymphocyte % : 12.0 %  Auto Monocyte % : 3.7 %  Auto Eosinophil % : 0.0 %  Auto Basophil % : 0.8 %    .		Differential:	[] Automated		[] Manual  Chemistry                        10.0   3.82  )-----------( 170      ( 23 Sep 2018 01:07 )             31.5     09-23    143  |  111<H>  |  24<H>  ----------------------------<  126<H>  3.6   |  25  |  0.65    Ca    6.7<L>      23 Sep 2018 01:07    TPro  5.5<L>  /  Alb  2.3<L>  /  TBili  0.9  /  DBili  x   /  AST  16  /  ALT  17  /  AlkPhos  61  09-23    LIVER FUNCTIONS - ( 23 Sep 2018 01:07 )  Alb: 2.3 g/dL / Pro: 5.5 gm/dL / ALK PHOS: 61 U/L / ALT: 17 U/L / AST: 16 U/L / GGT: x           PT/INR - ( 23 Sep 2018 01:06 )   PT: 14.0 sec;   INR: 1.28 ratio         PTT - ( 22 Sep 2018 17:15 )  PTT:46.7 sec      ABG - ( 23 Sep 2018 00:51 )  pH, Arterial: x     pH, Blood: 7.31  /  pCO2: 48    /  pO2: 112   / HCO3: 24    / Base Excess: -2.3  /  SaO2: 98          Post operative Sigmoid Perforation, r/o bacterial peritonitis  ID: vancomycin 750 mg IVPB      continue Zosyn  GI: monitor operative site; NGT to LWS  Heme Hold heparin 24 hours. Pneumatic Teds  Resp: PRVV 300/16/30/+5  GI prophylaxasis

## 2018-09-24 DIAGNOSIS — E87.6 HYPOKALEMIA: ICD-10-CM

## 2018-09-24 DIAGNOSIS — E83.39 OTHER DISORDERS OF PHOSPHORUS METABOLISM: ICD-10-CM

## 2018-09-24 DIAGNOSIS — D62 ACUTE POSTHEMORRHAGIC ANEMIA: ICD-10-CM

## 2018-09-24 DIAGNOSIS — E43 UNSPECIFIED SEVERE PROTEIN-CALORIE MALNUTRITION: ICD-10-CM

## 2018-09-24 LAB
ANION GAP SERPL CALC-SCNC: 10 MMOL/L — SIGNIFICANT CHANGE UP (ref 5–17)
BUN SERPL-MCNC: 15 MG/DL — SIGNIFICANT CHANGE UP (ref 7–23)
CALCIUM SERPL-MCNC: 6.8 MG/DL — LOW (ref 8.5–10.1)
CHLORIDE SERPL-SCNC: 106 MMOL/L — SIGNIFICANT CHANGE UP (ref 96–108)
CO2 SERPL-SCNC: 25 MMOL/L — SIGNIFICANT CHANGE UP (ref 22–31)
CREAT SERPL-MCNC: 0.53 MG/DL — SIGNIFICANT CHANGE UP (ref 0.5–1.3)
CULTURE RESULTS: NO GROWTH — SIGNIFICANT CHANGE UP
GLUCOSE BLDC GLUCOMTR-MCNC: 109 MG/DL — HIGH (ref 70–99)
GLUCOSE SERPL-MCNC: 94 MG/DL — SIGNIFICANT CHANGE UP (ref 70–99)
HCT VFR BLD CALC: 29.8 % — LOW (ref 34.5–45)
HGB BLD-MCNC: 9.4 G/DL — LOW (ref 11.5–15.5)
MAGNESIUM SERPL-MCNC: 1.7 MG/DL — SIGNIFICANT CHANGE UP (ref 1.6–2.6)
MCHC RBC-ENTMCNC: 30.6 PG — SIGNIFICANT CHANGE UP (ref 27–34)
MCHC RBC-ENTMCNC: 31.5 GM/DL — LOW (ref 32–36)
MCV RBC AUTO: 97.1 FL — SIGNIFICANT CHANGE UP (ref 80–100)
NRBC # BLD: 0 /100 WBCS — SIGNIFICANT CHANGE UP (ref 0–0)
PHOSPHATE SERPL-MCNC: 1.5 MG/DL — LOW (ref 2.5–4.5)
PLATELET # BLD AUTO: 189 K/UL — SIGNIFICANT CHANGE UP (ref 150–400)
POTASSIUM SERPL-MCNC: 3.4 MMOL/L — LOW (ref 3.5–5.3)
POTASSIUM SERPL-SCNC: 3.4 MMOL/L — LOW (ref 3.5–5.3)
RBC # BLD: 3.07 M/UL — LOW (ref 3.8–5.2)
RBC # FLD: 14.3 % — SIGNIFICANT CHANGE UP (ref 10.3–14.5)
SODIUM SERPL-SCNC: 141 MMOL/L — SIGNIFICANT CHANGE UP (ref 135–145)
SPECIMEN SOURCE: SIGNIFICANT CHANGE UP
WBC # BLD: 7.66 K/UL — SIGNIFICANT CHANGE UP (ref 3.8–10.5)
WBC # FLD AUTO: 7.66 K/UL — SIGNIFICANT CHANGE UP (ref 3.8–10.5)

## 2018-09-24 PROCEDURE — 99232 SBSQ HOSP IP/OBS MODERATE 35: CPT

## 2018-09-24 PROCEDURE — 99223 1ST HOSP IP/OBS HIGH 75: CPT

## 2018-09-24 RX ORDER — ONDANSETRON 8 MG/1
4 TABLET, FILM COATED ORAL EVERY 6 HOURS
Qty: 0 | Refills: 0 | Status: DISCONTINUED | OUTPATIENT
Start: 2018-09-24 | End: 2018-09-27

## 2018-09-24 RX ORDER — HEPARIN SODIUM 5000 [USP'U]/ML
5000 INJECTION INTRAVENOUS; SUBCUTANEOUS EVERY 12 HOURS
Qty: 0 | Refills: 0 | Status: DISCONTINUED | OUTPATIENT
Start: 2018-09-24 | End: 2018-09-27

## 2018-09-24 RX ORDER — PANTOPRAZOLE SODIUM 20 MG/1
40 TABLET, DELAYED RELEASE ORAL DAILY
Qty: 0 | Refills: 0 | Status: DISCONTINUED | OUTPATIENT
Start: 2018-09-24 | End: 2018-09-27

## 2018-09-24 RX ORDER — OXYCODONE AND ACETAMINOPHEN 5; 325 MG/1; MG/1
1 TABLET ORAL EVERY 4 HOURS
Qty: 0 | Refills: 0 | Status: DISCONTINUED | OUTPATIENT
Start: 2018-09-24 | End: 2018-09-27

## 2018-09-24 RX ORDER — MORPHINE SULFATE 50 MG/1
2 CAPSULE, EXTENDED RELEASE ORAL EVERY 4 HOURS
Qty: 0 | Refills: 0 | Status: DISCONTINUED | OUTPATIENT
Start: 2018-09-24 | End: 2018-09-27

## 2018-09-24 RX ORDER — ACETAMINOPHEN 500 MG
650 TABLET ORAL EVERY 6 HOURS
Qty: 0 | Refills: 0 | Status: DISCONTINUED | OUTPATIENT
Start: 2018-09-24 | End: 2018-09-27

## 2018-09-24 RX ORDER — SODIUM CHLORIDE 9 MG/ML
1000 INJECTION, SOLUTION INTRAVENOUS
Qty: 0 | Refills: 0 | Status: DISCONTINUED | OUTPATIENT
Start: 2018-09-24 | End: 2018-09-27

## 2018-09-24 RX ORDER — POTASSIUM PHOSPHATE, MONOBASIC POTASSIUM PHOSPHATE, DIBASIC 236; 224 MG/ML; MG/ML
15 INJECTION, SOLUTION INTRAVENOUS ONCE
Qty: 0 | Refills: 0 | Status: COMPLETED | OUTPATIENT
Start: 2018-09-24 | End: 2018-09-24

## 2018-09-24 RX ADMIN — POTASSIUM PHOSPHATE, MONOBASIC POTASSIUM PHOSPHATE, DIBASIC 63.75 MILLIMOLE(S): 236; 224 INJECTION, SOLUTION INTRAVENOUS at 07:31

## 2018-09-24 RX ADMIN — PIPERACILLIN AND TAZOBACTAM 25 GRAM(S): 4; .5 INJECTION, POWDER, LYOPHILIZED, FOR SOLUTION INTRAVENOUS at 14:05

## 2018-09-24 RX ADMIN — SODIUM CHLORIDE 50 MILLILITER(S): 9 INJECTION, SOLUTION INTRAVENOUS at 10:06

## 2018-09-24 RX ADMIN — PIPERACILLIN AND TAZOBACTAM 25 GRAM(S): 4; .5 INJECTION, POWDER, LYOPHILIZED, FOR SOLUTION INTRAVENOUS at 21:53

## 2018-09-24 RX ADMIN — PIPERACILLIN AND TAZOBACTAM 25 GRAM(S): 4; .5 INJECTION, POWDER, LYOPHILIZED, FOR SOLUTION INTRAVENOUS at 05:46

## 2018-09-24 RX ADMIN — FLUCONAZOLE 50 MILLIGRAM(S): 150 TABLET ORAL at 09:00

## 2018-09-24 RX ADMIN — Medication 250 MILLIGRAM(S): at 17:38

## 2018-09-24 RX ADMIN — CHLORHEXIDINE GLUCONATE 1 APPLICATION(S): 213 SOLUTION TOPICAL at 05:47

## 2018-09-24 RX ADMIN — HEPARIN SODIUM 5000 UNIT(S): 5000 INJECTION INTRAVENOUS; SUBCUTANEOUS at 17:27

## 2018-09-24 RX ADMIN — Medication 250 MILLIGRAM(S): at 05:46

## 2018-09-24 NOTE — DIETITIAN INITIAL EVALUATION ADULT. - ETIOLOGY
Inadequate energy & protein intake related to severe sepsis with septic shock & sigmoid bowel perforation

## 2018-09-24 NOTE — PROGRESS NOTE ADULT - SUBJECTIVE AND OBJECTIVE BOX
HPI:  85F with PMH RA, Depression/Anxiety, GIST tumor of stomach (s/p resection ~14yrs ago) presents c/o abdominal pain x 1 week. Patient states she has had mild abdominal pain x1 week, worsened this AM prompting arrival to the ER. Patient states pain is constant, diffuse, non radiating, no alleviating or worsening factors. Associated with nausea, denies vomiting. Admits to constipation x 1 week, which is not unusual for her. Last had a BM this morning, stool was watery. Denies hx of similar event.   Denies fever/chills, chest pain, sob, dizziness, urinary symptoms. (22 Sep 2018 18:11)      24 hr events: Weaned off pressors. No acute events. Feels well this morning. Has ostomy output. No chest or abdominal pain. No dyspnea. No fevers.    ## ROS:  See above. ROS otherwise negative.    ## Vitals  ICU Vital Signs Last 24 Hrs  T(C): 35.8 (24 Sep 2018 08:00), Max: 36.8 (23 Sep 2018 19:17)  T(F): 96.4 (24 Sep 2018 08:00), Max: 98.3 (23 Sep 2018 19:17)  HR: 93 (24 Sep 2018 09:00) (90 - 134)  BP: 107/58 (24 Sep 2018 09:00) (73/52 - 139/55)  BP(mean): 70 (24 Sep 2018 09:00) (57 - 99)  ABP: --  ABP(mean): --  RR: 21 (24 Sep 2018 09:00) (10 - 40)  SpO2: 99% (24 Sep 2018 09:00) (85% - 100%)      ## Physical Exam:  Gen: Elderly female lying in bed, NAD  HEENT: NC/AT sclerae anicteric  Resp: No increased WOB. CTAB  CV: S1, S2  Abd: Soft, Ostomy bag w/stool in it. Surgical wounds dressed. + BS  Ext: WWP  Neuro: Awake, alert, follows commands    ## Vent Data  Mode: nasal cannula 2lpm  FiO2: 28      ## Labs:  Chem:  09-24    141  |  106  |  15  ----------------------------<  94  3.4<L>   |  25  |  0.53    Ca    6.8<L>      24 Sep 2018 03:34  Phos  1.5     09-24  Mg     1.7     09-24    TPro  5.5<L>  /  Alb  2.3<L>  /  TBili  0.9  /  DBili  x   /  AST  16  /  ALT  17  /  AlkPhos  61  09-23    LIVER FUNCTIONS - ( 23 Sep 2018 01:07 )  Alb: 2.3 g/dL / Pro: 5.5 gm/dL / ALK PHOS: 61 U/L / ALT: 17 U/L / AST: 16 U/L / GGT: x           CBC:                        9.4    7.66  )-----------( 189      ( 24 Sep 2018 03:34 )             29.8     Coags:  PT/INR - ( 23 Sep 2018 01:06 )   PT: 14.0 sec;   INR: 1.28 ratio         PTT - ( 22 Sep 2018 17:15 )  PTT:46.7 sec        ## Cardiac        ## Blood Gas  ABG - ( 23 Sep 2018 11:00 )  pH, Arterial: x     pH, Blood: 7.44  /  pCO2: 35    /  pO2: 138   / HCO3: 23    / Base Excess: 0.2   /  SaO2: 99                  #I/Os  I&O's Detail    23 Sep 2018 07:01  -  24 Sep 2018 07:00  --------------------------------------------------------  IN:    dexmedetomidine Infusion: 3.1 mL    Lactated Ringers IV Bolus: 1000 mL    lactated ringers.: 2300 mL    norepinephrine Infusion: 41.6 mL    Solution: 300 mL    Solution: 500 mL    Solution: 50 mL  Total IN: 4194.7 mL    OUT:    Colostomy: 35 mL    Indwelling Catheter - Urethral: 675 mL    Nasoenteral Tube: 150 mL  Total OUT: 860 mL    Total NET: 3334.7 mL      24 Sep 2018 07:01  -  24 Sep 2018 09:50  --------------------------------------------------------  IN:    lactated ringers.: 100 mL  Total IN: 100 mL    OUT:    Indwelling Catheter - Urethral: 50 mL  Total OUT: 50 mL    Total NET: 50 mL          ## Imaging:    ## Medications:  MEDICATIONS  (STANDING):  chlorhexidine 4% Liquid 1 Application(s) Topical <User Schedule>  dextrose 5%. 1000 milliLiter(s) (50 mL/Hr) IV Continuous <Continuous>  fluconAZOLE IVPB 100 milliGRAM(s) IV Intermittent every 24 hours  fluconAZOLE IVPB      heparin  Injectable 5000 Unit(s) SubCutaneous every 12 hours  influenza   Vaccine 0.5 milliLiter(s) IntraMuscular once  piperacillin/tazobactam IVPB. 3.375 Gram(s) IV Intermittent every 8 hours  vancomycin  IVPB 750 milliGRAM(s) IV Intermittent every 12 hours    MEDICATIONS  (PRN):  morphine  - Injectable 2 milliGRAM(s) IV Push every 4 hours PRN Moderate Pain (4 - 6)

## 2018-09-24 NOTE — CONSULT NOTE ADULT - SUBJECTIVE AND OBJECTIVE BOX
Infectious Diseases - Attending at Dr. Kilgore    HPI:  85F with PMH RA, Depression/Anxiety, GIST tumor of stomach (s/p resection ~14yrs ago) presents c/o abdominal pain x 1 week. Patient states she has had mild abdominal pain x1 week, worsened this AM prompting arrival to the ER. Patient states pain is constant, diffuse, non radiating, no alleviating or worsening factors. Associated with nausea, denies vomiting. Admits to constipation x 1 week, which is not unusual for her. Last had a BM this morning, stool was watery. Denies hx of similar event.   Denies fever/chills, chest pain, sob, dizziness, urinary symptoms. (22 Sep 2018 18:11)  Pt now s/p Silva procedure for diverticulitis with perforation with peritonitis .On vanco/zosyn and diflucan .Improving .Awaiting transfer to medical bed      PAST MEDICAL & SURGICAL HISTORY:  Rheumatoid arthritis  Gastrointestinal stromal tumor (GIST)      Allergies    No Known Allergies    Intolerances        FAMILY HISTORY:  No pertinent family history in first degree relatives      SOCIAL HISTORY: non smoker  REVIEW OF SYSTEMS:    Constitutional: No fever, weight loss or+ fatigue  Eyes: No eye pain, visual disturbances, or discharge  ENT:  No difficulty hearing, tinnitus, vertigo; No sinus or throat pain  Neck: No pain or stiffness  Respiratory: No cough, wheezing, chills or hemoptysis  Cardiovascular: No chest pain, palpitations, shortness of breath, dizziness or leg swelling  Gastrointestinal: abdominal pain +,No nausea, vomiting or hematemesis; No diarrhea or constipation. No melena or hematochezia.  Genitourinary: No dysuria, frequency, hematuria or incontinence  Neurological: No headaches, memory loss, loss of strength, numbness or tremors  Skin: No itching, burning, rashes or lesions       MEDICATIONS  (STANDING):  carbidopa/levodopa  25/100 1 Tablet(s) Oral <User Schedule>  dextrose 5%. 1000 milliLiter(s) (50 mL/Hr) IV Continuous <Continuous>  escitalopram 5 milliGRAM(s) Oral daily  fluconAZOLE IVPB 100 milliGRAM(s) IV Intermittent every 24 hours  fluconAZOLE IVPB      heparin  Injectable 5000 Unit(s) SubCutaneous every 12 hours  hydroxychloroquine 200 milliGRAM(s) Oral daily  influenza   Vaccine 0.5 milliLiter(s) IntraMuscular once  mirtazapine 15 milliGRAM(s) Oral at bedtime  pantoprazole  Injectable 40 milliGRAM(s) IV Push daily  piperacillin/tazobactam IVPB. 3.375 Gram(s) IV Intermittent every 8 hours  vancomycin  IVPB 750 milliGRAM(s) IV Intermittent every 12 hours    MEDICATIONS  (PRN):  acetaminophen   Tablet .. 650 milliGRAM(s) Oral every 6 hours PRN Temp greater or equal to 38C (100.4F), Mild Pain (1 - 3)  morphine  - Injectable 2 milliGRAM(s) IV Push every 4 hours PRN Severe Pain (7 - 10)  ondansetron Injectable 4 milliGRAM(s) IV Push every 6 hours PRN Nausea and/or Vomiting  oxyCODONE    5 mG/acetaminophen 325 mG 1 Tablet(s) Oral every 4 hours PRN Moderate Pain (4 - 6)      Vital Signs Last 24 Hrs  Tmax :afebrile  HR: 61  BP: 120/86   RR: 16  SpO2: 98%     PHYSICAL EXAM:    Constitutional: NAD, well-groomed, well-developed  HEENT: PERRLA, EOMI, Normal Hearing, MMM  Neck: No LAD, No JVD  Back: Normal spine flexure, No CVA tenderness  Respiratory: CTAB/L  Cardiovascular: S1 and S2, RRR, no M/G/R  Gastrointestinal: s/p exlap with silva procedure ,colostomy bag+  Extremities: No peripheral edema  Vascular: 2+ peripheral pulses  Neurological: A/O x 3, no focal deficits  Skin: No rashes      LABS:              WBC 2-->7.66  cr  wnl          MICROBIOLOGY:  RECENT CULTURES:  09-23 .Urine Clean Catch (Midstream) XXXX XXXX   No growth    09-23 Abdominal Fl intra abdominal fluid c/s Aeromonas hydrophila/caviae  Klebsiella pneumoniae  Escherichia coli   Rare Gram Negative Rods per oil power field  Rare Gram positive cocci in pairs per oil power field  Moderate polymorphonuclear leukocytes seen per low power field   Few Klebsiella pneumoniae  Few Escherichia coli  Few Aeromonas hydrophila/caviae    09-22 .Blood Blood-Peripheral XXXX XXXX   No growth to date.          RADIOLOGY & ADDITIONAL STUDIES:  < from: CT Abdomen and Pelvis w/ IV Cont (09.22.18 @ 16:15) >    IMPRESSION:  Free intraperitoneal air and fluid is identified consistent   with bowel perforation. In light of pathologic thickening of the mid   sigmoid colon and stranding of the pelvic mesentery the possibility for   sigmoid colonic perforation is raised; however, diffuse small bowel wall   thickening with extensive mesenteric edema as well as a segment of small   bowel within the left lower quadrant demonstrating less robust   enhancement raises the possibility for small bowel ischemia with   secondary perforation    < end of copied text >
HPI:  85F with PMH RA, Depression/Anxiety, GIST tumor of stomach (s/p resection ~14yrs ago) presents c/o abdominal pain x 1 week. Patient states she has had mild abdominal pain x1 week, worsened this AM prompting arrival to the ER. Patient states pain is constant, diffuse, non radiating, no alleviating or worsening factors. Associated with nausea, denies vomiting. Admits to constipation x 1 week, which is not unusual for her. Last had a BM this morning, stool was watery. Denies hx of similar event.   Denies fever/chills, chest pain, sob, dizziness, urinary symptoms. (22 Sep 2018 18:11)      PAST MEDICAL & SURGICAL HISTORY:  Rheumatoid arthritis  Gastrointestinal stromal tumor (GIST)      REVIEW OF SYSTEMS:    CONSTITUTIONAL: No fever, weight loss, or fatigue  EYES: No eye pain, visual disturbances, or discharge  ENMT:  No difficulty hearing, tinnitus, vertigo; No sinus or throat pain  NECK: No pain or stiffness  BREASTS: No pain, masses, or nipple discharge  RESPIRATORY: No cough, wheezing, chills or hemoptysis; No shortness of breath  CARDIOVASCULAR: No chest pain, palpitations, dizziness, or leg swelling  GASTROINTESTINAL: No abdominal or epigastric pain. No nausea, vomiting, or hematemesis; No diarrhea or constipation. No melena or hematochezia.  GENITOURINARY: No dysuria, frequency, hematuria, or incontinence  NEUROLOGICAL: No headaches, memory loss, loss of strength, numbness, or tremors  SKIN: No itching, burning, rashes, or lesions   LYMPH NODES: No enlarged glands  ENDOCRINE: No heat or cold intolerance; No hair loss  MUSCULOSKELETAL: No joint pain or swelling; No muscle, back, or extremity pain  PSYCHIATRIC: No depression, anxiety, mood swings, or difficulty sleeping  HEME/LYMPH: No easy bruising, or bleeding gums  ALLERGY AND IMMUNOLOGIC: No hives or eczema      MEDICATIONS  (STANDING):  chlorhexidine 4% Liquid 1 Application(s) Topical <User Schedule>  dextrose 5%. 1000 milliLiter(s) (50 mL/Hr) IV Continuous <Continuous>  fluconAZOLE IVPB 100 milliGRAM(s) IV Intermittent every 24 hours  fluconAZOLE IVPB      heparin  Injectable 5000 Unit(s) SubCutaneous every 12 hours  influenza   Vaccine 0.5 milliLiter(s) IntraMuscular once  piperacillin/tazobactam IVPB. 3.375 Gram(s) IV Intermittent every 8 hours  vancomycin  IVPB 750 milliGRAM(s) IV Intermittent every 12 hours    MEDICATIONS  (PRN):  morphine  - Injectable 2 milliGRAM(s) IV Push every 4 hours PRN Moderate Pain (4 - 6)      Allergies    No Known Allergies    Intolerances        SOCIAL HISTORY:    FAMILY HISTORY:  No pertinent family history in first degree relatives      Vital Signs Last 24 Hrs  T(C): 36.1 (24 Sep 2018 15:28), Max: 36.8 (23 Sep 2018 19:17)  T(F): 97 (24 Sep 2018 15:28), Max: 98.3 (23 Sep 2018 19:17)  HR: 94 (24 Sep 2018 16:00) (78 - 126)  BP: 122/63 (24 Sep 2018 16:00) (73/52 - 125/55)  BP(mean): 78 (24 Sep 2018 16:00) (57 - 89)  RR: 31 (24 Sep 2018 16:00) (12 - 40)  SpO2: 96% (24 Sep 2018 16:00) (85% - 100%)    PHYSICAL EXAM:    GENERAL: NAD, well-groomed, well-developed, frail elderly female   HEAD:  Atraumatic, Normocephalic  EYES: EOMI, PERRLA, conjunctiva and sclera clear  ENMT: No tonsillar erythema, exudates, or enlargement; Moist mucous membranes, Good dentition, No lesions  NECK: Supple, No JVD, Normal thyroid  NERVOUS SYSTEM:  Alert & Oriented X3, Good concentration; Motor Strength 5/5 B/L upper and lower extremities; DTRs 2+ intact and symmetric  CHEST/LUNG: Clear to percussion bilaterally; No rales, rhonchi, wheezing, or rubs  HEART: Regular rate and rhythm; No murmurs, rubs, or gallops  ABDOMEN: Soft, Ostomy bag w/stool in it. Surgical wounds dressed. + BS  EXTREMITIES:  2+ Peripheral Pulses, No clubbing, cyanosis, or edema  LYMPH: No lymphadenopathy noted         LABS:                        9.4    7.66  )-----------( 189      ( 24 Sep 2018 03:34 )             29.8     09-24    141  |  106  |  15  ----------------------------<  94  3.4<L>   |  25  |  0.53    Ca    6.8<L>      24 Sep 2018 03:34  Phos  1.5     09-24  Mg     1.7     09-24    TPro  5.5<L>  /  Alb  2.3<L>  /  TBili  0.9  /  DBili  x   /  AST  16  /  ALT  17  /  AlkPhos  61  09-23    PT/INR - ( 23 Sep 2018 01:06 )   PT: 14.0 sec;   INR: 1.28 ratio         PTT - ( 22 Sep 2018 17:15 )  PTT:46.7 sec      RADIOLOGY & ADDITIONAL STUDIES:

## 2018-09-24 NOTE — DIETITIAN INITIAL EVALUATION ADULT. - NS AS NUTRI INTERV MEALS SNACK
Fat - modified diet/Fiber - modified diet/Recommend advance po diet when medically feasible Clear liquids to full liquids to Low fiber/Low fat

## 2018-09-24 NOTE — PROGRESS NOTE ADULT - ASSESSMENT
84 y/o F w/RA, history of GIST of stomach s/p resection admitted for perforated sigmoid colon c/b severe sepsis with septic shock, now s/p ex-LAP w/washout, sigmoid resection, and chung's, doing well postoperatively. Shock now resolved.     - NPO, D5 gtt  - Continue abx/antifungals  - Replete K for hypokalemia, replete phos for hypophosphatemia  - List to floor 84 y/o F w/RA, history of GIST of stomach s/p resection admitted for perforated sigmoid colon c/b severe sepsis with septic shock, now s/p ex-LAP w/washout, sigmoid resection, and chung's, doing well postoperatively. Shock now resolved.     - NPO, D5 gtt  - Continue abx/antifungals  - Replete K for hypokalemia, replete phos for hypophosphatemia  - Post operative care as per surgical team  - List to floor

## 2018-09-24 NOTE — CONSULT NOTE ADULT - ASSESSMENT
85 yr old female was admitted with abdominal pain and found to have bowel perforation with sigmoid diverticulitis ,underwent emergent exlap with chung procedure .C/s growing gram negative and aeromonas .Pt on vanco zosyn and empiric diflucan   continues to do ok ,seen today with
85y Female with PMH: RA,  admitted with Abdominal pain 1 S/P Exploratory Laparotomy Sigmoid resection with Hartmans procedure, Peritoneal lavage   secondary to Sigmoid perforation (likely perforated diverticulitis) found to have diffuse purulent peritonitis.  Pt on BS IV abx and antifungal treatment.  Pt extubated successfully to NC yesterday and tolerating NC.  Pt transiently on pressors yesterday likely secondary to septic shock.   Will defer to surgery re: tube feeds vs PO intake of meds/diet.

## 2018-09-24 NOTE — CHART NOTE - NSCHARTNOTEFT_GEN_A_CORE
Pt medically stable for transfer to medical floor.     85y Female with PMH: RA,  admitted with Abdominal pain 1 S/P Exploratory Laparotomy Sigmoid resection with Hartmans procedure, Peritoneal lavage   secondary to Sigmoid perforation (likely perforated diverticulitis) found to have diffuse purulent peritonitis.  Pt on BS IV abx and antifungal treatment.  Pt extubated successfully to NC yesterday and tolerating NC.  Pt transiently on pressors yesterday likely secondary to septic shock.   Will defer to surgery re: tube feeds vs PO intake of meds/diet.  Consider ID consult. Pt medically stable for transfer to medical floor.  Signed out to PA Behan.      85y Female with PMH: RA,  admitted with Abdominal pain 1 S/P Exploratory Laparotomy Sigmoid resection with Hartmans procedure, Peritoneal lavage   secondary to Sigmoid perforation (likely perforated diverticulitis) found to have diffuse purulent peritonitis.  Pt on BS IV abx and antifungal treatment.  Pt extubated successfully to NC yesterday and tolerating NC.  Pt transiently on pressors yesterday likely secondary to septic shock.   Will defer to surgery re: tube feeds vs PO intake of meds/diet.  Will call ID (Dr. Brown) and medicine (hospitalist) consults. Pt medically stable for transfer to medical floor.  Signed out to PA Behan.      85y Female with PMH: RA,  admitted with Abdominal pain 1 S/P Exploratory Laparotomy Sigmoid resection with Hartmans procedure, Peritoneal lavage   secondary to Sigmoid perforation (likely perforated diverticulitis) found to have diffuse purulent peritonitis.  Pt on BS IV abx and antifungal treatment.  Pt extubated successfully to NC yesterday and tolerating NC.  Pt transiently on pressors yesterday likely secondary to septic shock.   Will defer to surgery re: tube feeds vs PO intake of meds/diet.  Will call ID (Dr. Brown) and medicine (hospitalist) consults.    Pt's outside PMD is Dr. Ariana Mullen.  Family and pt requesting that her medical information can be communicated with her.

## 2018-09-24 NOTE — CHART NOTE - NSCHARTNOTEFT_GEN_A_CORE
Upon Nutritional Assessment by the Registered Dietitian your patient was determined to meet criteria / has evidence of the following diagnosis/diagnoses:          [ ]  Mild Protein Calorie Malnutrition        [x ]  Moderate Protein Calorie Malnutrition        [ ] Severe Protein Calorie Malnutrition        [ ] Unspecified Protein Calorie Malnutrition        [ ] Underweight / BMI <19        [ ] Morbid Obesity / BMI > 40      Findings as based on:  •  Comprehensive nutrition assessment and consultation  •  Calorie counts (nutrient intake analysis)  •  Food acceptance and intake status from observations by staff  •  Follow up  •  Patient education  •  Intervention secondary to interdisciplinary rounds  •   concerns      Treatment:    The following diet has been recommended:  Recommend advance po diet when medically feasible Clear liquids to full liquids to Low fiber/Low fat & Ensure Clear 3 x day(200kcal & 7gm protein)    PROVIDER Section:     By signing this assessment you are acknowledging and agree with the diagnosis/diagnoses assigned by the Registered Dietitian    Comments:

## 2018-09-24 NOTE — PROGRESS NOTE ADULT - SUBJECTIVE AND OBJECTIVE BOX
INTERVAL HPI/OVERNIGHT EVENTS:  Pt. seen and examined at bedside resting comfortably. No complaints offered, denies abdominal pain. Pain well controlled with medications. Denies N/V.   NGT with bilious output.  Extubated yesterday doing well.   Denies fever/chills, chest pain, dyspnea, cough, dizziness.     Vital Signs Last 24 Hrs  T(C): 36 (24 Sep 2018 04:30), Max: 36.8 (23 Sep 2018 19:17)  T(F): 96.8 (24 Sep 2018 04:30), Max: 98.3 (23 Sep 2018 19:17)  HR: 102 (24 Sep 2018 05:00) (91 - 134)  BP: 111/80 (24 Sep 2018 05:00) (73/52 - 139/55)  BP(mean): 84 (24 Sep 2018 05:00) (57 - 99)  RR: 20 (24 Sep 2018 05:00) (10 - 40)  SpO2: 98% (24 Sep 2018 05:00) (85% - 100%)    PHYSICAL EXAM:    GENERAL: NAD, NGT in situ draining bilious fluid (50cc overnight)  CHEST/LUNG: Clear to ausculation, bilaterally   HEART: S1S2  ABDOMEN: Dressing and packing changed, wound clean and dry, staples intact, no drainage or bleeding. Wound irrigated with NS. Ostomy pink and viable with min. s/s fluid in bag. Nondistended. Appropriative postop tenderness, no guarding, no rebound.   EXTREMITIES:  calf soft, non tender b/l. 2+ distal pulses b/l.     I&O's Detail    22 Sep 2018 07:01  -  23 Sep 2018 07:00  --------------------------------------------------------  IN:    dexmedetomidine Infusion: 6 mL    lactated ringers.: 800 mL    Solution: 250 mL    Solution: 100 mL  Total IN: 1156 mL    OUT:    Indwelling Catheter - Urethral: 380 mL    Nasoenteral Tube: 50 mL  Total OUT: 430 mL    Total NET: 726 mL      23 Sep 2018 07:01  -  24 Sep 2018 05:33  --------------------------------------------------------  IN:    dexmedetomidine Infusion: 3.1 mL    Lactated Ringers IV Bolus: 1000 mL    lactated ringers.: 2000 mL    norepinephrine Infusion: 41.6 mL    Solution: 200 mL    Solution: 50 mL    Solution: 250 mL  Total IN: 3544.7 mL    OUT:    Indwelling Catheter - Urethral: 600 mL    Nasoenteral Tube: 50 mL  Total OUT: 650 mL    Total NET: 2894.7 mL      LABS:                        9.4    7.66  )-----------( 189      ( 24 Sep 2018 03:34 )             29.8     09-24    141  |  106  |  15  ----------------------------<  94  3.4<L>   |  25  |  0.53    Ca    6.8<L>      24 Sep 2018 03:34  Phos  1.5     09-24  Mg     1.7     09-24    TPro  5.5<L>  /  Alb  2.3<L>  /  TBili  0.9  /  DBili  x   /  AST  16  /  ALT  17  /  AlkPhos  61  09-23    PT/INR - ( 23 Sep 2018 01:06 )   PT: 14.0 sec;   INR: 1.28 ratio         PTT - ( 22 Sep 2018 17:15 )  PTT:46.7 sec    Culture Results:   No growth to date. (09-22 @ 23:36)  Culture Results:   No growth to date. (09-22 @ 23:36)    Impression: 85y Female admitted with Abdominal pain POD #1 S/P Exploratory Laparotomy Sigmoid resection with Hartmans procedure, Peritoneal lavage   secondary to Sigmoid perforation (likely perforated diverticulitis), Diffuse purulent peritonitis     PMH Rheumatoid arthritis      Plan:  - Continue Antibiotics, f/u OR culture  - Continue NPO/IVF, NGT to Suction  - Mata cath, monitor urine output  - Continue VTE prophylaxis with SQ heparin, venodynes  - Local wound care by surgical team  - Ostomy care, monitor output  - Continue primary care per CCU  - f/u AM labs  - will discuss with surgical attending for Recommendations

## 2018-09-24 NOTE — DIETITIAN INITIAL EVALUATION ADULT. - PERTINENT LABORATORY DATA
09-24 Na 141 mmol/L Glu 94 mg/dL K+ 3.4 mmol/L<L> Cr  0.53 mg/dL BUN 15 mg/dL Phos 1.5 mg/dL<L> Alb n/a   PAB n/a   Hgb 9.4 g/dL<L> Hct 29.8 %<L>, Alb=2.3(9/23)

## 2018-09-24 NOTE — DIETITIAN INITIAL EVALUATION ADULT. - OTHER INFO
Pt seen for CCU admission. Pt lives alone & pt's son & children help with cooking & food shopping. Pt's daughters frequently take pt out to eat. Pt with +abdominal pain x 1 week PTA & s/p sigmoid colon resection 9/23 & Silva's procedure 9/23 (Colostomy=35ml(9/24)).  Pt extubated 9/23.

## 2018-09-25 DIAGNOSIS — K65.0 GENERALIZED (ACUTE) PERITONITIS: ICD-10-CM

## 2018-09-25 DIAGNOSIS — A41.9 SEPSIS, UNSPECIFIED ORGANISM: ICD-10-CM

## 2018-09-25 DIAGNOSIS — K57.32 DIVERTICULITIS OF LARGE INTESTINE WITHOUT PERFORATION OR ABSCESS WITHOUT BLEEDING: ICD-10-CM

## 2018-09-25 PROBLEM — Z00.00 ENCOUNTER FOR PREVENTIVE HEALTH EXAMINATION: Status: ACTIVE | Noted: 2018-09-25

## 2018-09-25 LAB
-  AMIKACIN: SIGNIFICANT CHANGE UP
-  AMOXICILLIN/CLAVULANIC ACID: SIGNIFICANT CHANGE UP
-  AMOXICILLIN/CLAVULANIC ACID: SIGNIFICANT CHANGE UP
-  AMPICILLIN/SULBACTAM: SIGNIFICANT CHANGE UP
-  AMPICILLIN: SIGNIFICANT CHANGE UP
-  AMPICILLIN: SIGNIFICANT CHANGE UP
-  AZTREONAM: SIGNIFICANT CHANGE UP
-  CEFAZOLIN: SIGNIFICANT CHANGE UP
-  CEFEPIME: SIGNIFICANT CHANGE UP
-  CEFOXITIN: SIGNIFICANT CHANGE UP
-  CEFTAZIDIME: SIGNIFICANT CHANGE UP
-  CEFTRIAXONE: SIGNIFICANT CHANGE UP
-  CIPROFLOXACIN: SIGNIFICANT CHANGE UP
-  ERTAPENEM: SIGNIFICANT CHANGE UP
-  ERTAPENEM: SIGNIFICANT CHANGE UP
-  GENTAMICIN: SIGNIFICANT CHANGE UP
-  IMIPENEM: SIGNIFICANT CHANGE UP
-  IMIPENEM: SIGNIFICANT CHANGE UP
-  LEVOFLOXACIN: SIGNIFICANT CHANGE UP
-  MEROPENEM: SIGNIFICANT CHANGE UP
-  PIPERACILLIN/TAZOBACTAM: SIGNIFICANT CHANGE UP
-  TOBRAMYCIN: SIGNIFICANT CHANGE UP
-  TOBRAMYCIN: SIGNIFICANT CHANGE UP
-  TRIMETHOPRIM/SULFAMETHOXAZOLE: SIGNIFICANT CHANGE UP
ANION GAP SERPL CALC-SCNC: 6 MMOL/L — SIGNIFICANT CHANGE UP (ref 5–17)
ANION GAP SERPL CALC-SCNC: 7 MMOL/L — SIGNIFICANT CHANGE UP (ref 5–17)
BUN SERPL-MCNC: 5 MG/DL — LOW (ref 7–23)
BUN SERPL-MCNC: 7 MG/DL — SIGNIFICANT CHANGE UP (ref 7–23)
CALCIUM SERPL-MCNC: 6.6 MG/DL — LOW (ref 8.5–10.1)
CALCIUM SERPL-MCNC: 6.8 MG/DL — LOW (ref 8.5–10.1)
CHLORIDE SERPL-SCNC: 105 MMOL/L — SIGNIFICANT CHANGE UP (ref 96–108)
CHLORIDE SERPL-SCNC: 107 MMOL/L — SIGNIFICANT CHANGE UP (ref 96–108)
CO2 SERPL-SCNC: 27 MMOL/L — SIGNIFICANT CHANGE UP (ref 22–31)
CO2 SERPL-SCNC: 29 MMOL/L — SIGNIFICANT CHANGE UP (ref 22–31)
CREAT SERPL-MCNC: 0.49 MG/DL — LOW (ref 0.5–1.3)
CREAT SERPL-MCNC: 0.56 MG/DL — SIGNIFICANT CHANGE UP (ref 0.5–1.3)
GLUCOSE SERPL-MCNC: 129 MG/DL — HIGH (ref 70–99)
GLUCOSE SERPL-MCNC: 141 MG/DL — HIGH (ref 70–99)
HCT VFR BLD CALC: 30.4 % — LOW (ref 34.5–45)
HGB BLD-MCNC: 9.9 G/DL — LOW (ref 11.5–15.5)
MAGNESIUM SERPL-MCNC: 1.8 MG/DL — SIGNIFICANT CHANGE UP (ref 1.6–2.6)
MCHC RBC-ENTMCNC: 30.8 PG — SIGNIFICANT CHANGE UP (ref 27–34)
MCHC RBC-ENTMCNC: 32.6 GM/DL — SIGNIFICANT CHANGE UP (ref 32–36)
MCV RBC AUTO: 94.7 FL — SIGNIFICANT CHANGE UP (ref 80–100)
METHOD TYPE: SIGNIFICANT CHANGE UP
NRBC # BLD: 0 /100 WBCS — SIGNIFICANT CHANGE UP (ref 0–0)
PHOSPHATE SERPL-MCNC: 1.1 MG/DL — LOW (ref 2.5–4.5)
PLATELET # BLD AUTO: 207 K/UL — SIGNIFICANT CHANGE UP (ref 150–400)
POTASSIUM SERPL-MCNC: 2.8 MMOL/L — CRITICAL LOW (ref 3.5–5.3)
POTASSIUM SERPL-MCNC: 3.4 MMOL/L — LOW (ref 3.5–5.3)
POTASSIUM SERPL-SCNC: 2.8 MMOL/L — CRITICAL LOW (ref 3.5–5.3)
POTASSIUM SERPL-SCNC: 3.4 MMOL/L — LOW (ref 3.5–5.3)
RBC # BLD: 3.21 M/UL — LOW (ref 3.8–5.2)
RBC # FLD: 13.9 % — SIGNIFICANT CHANGE UP (ref 10.3–14.5)
SODIUM SERPL-SCNC: 139 MMOL/L — SIGNIFICANT CHANGE UP (ref 135–145)
SODIUM SERPL-SCNC: 142 MMOL/L — SIGNIFICANT CHANGE UP (ref 135–145)
SURGICAL PATHOLOGY STUDY: SIGNIFICANT CHANGE UP
VANCOMYCIN TROUGH SERPL-MCNC: 17.5 UG/ML — SIGNIFICANT CHANGE UP (ref 10–20)
WBC # BLD: 8.71 K/UL — SIGNIFICANT CHANGE UP (ref 3.8–10.5)
WBC # FLD AUTO: 8.71 K/UL — SIGNIFICANT CHANGE UP (ref 3.8–10.5)

## 2018-09-25 PROCEDURE — 99232 SBSQ HOSP IP/OBS MODERATE 35: CPT

## 2018-09-25 RX ORDER — POTASSIUM CHLORIDE 20 MEQ
40 PACKET (EA) ORAL EVERY 4 HOURS
Qty: 0 | Refills: 0 | Status: DISCONTINUED | OUTPATIENT
Start: 2018-09-25 | End: 2018-09-25

## 2018-09-25 RX ORDER — MIRTAZAPINE 45 MG/1
15 TABLET, ORALLY DISINTEGRATING ORAL AT BEDTIME
Qty: 0 | Refills: 0 | Status: DISCONTINUED | OUTPATIENT
Start: 2018-09-25 | End: 2018-09-27

## 2018-09-25 RX ORDER — HYDROXYCHLOROQUINE SULFATE 200 MG
200 TABLET ORAL DAILY
Qty: 0 | Refills: 0 | Status: DISCONTINUED | OUTPATIENT
Start: 2018-09-25 | End: 2018-09-27

## 2018-09-25 RX ORDER — ESCITALOPRAM OXALATE 10 MG/1
5 TABLET, FILM COATED ORAL DAILY
Qty: 0 | Refills: 0 | Status: DISCONTINUED | OUTPATIENT
Start: 2018-09-25 | End: 2018-09-27

## 2018-09-25 RX ORDER — POTASSIUM CHLORIDE 20 MEQ
40 PACKET (EA) ORAL ONCE
Qty: 0 | Refills: 0 | Status: COMPLETED | OUTPATIENT
Start: 2018-09-25 | End: 2018-09-25

## 2018-09-25 RX ORDER — POTASSIUM PHOSPHATE, MONOBASIC POTASSIUM PHOSPHATE, DIBASIC 236; 224 MG/ML; MG/ML
15 INJECTION, SOLUTION INTRAVENOUS ONCE
Qty: 0 | Refills: 0 | Status: COMPLETED | OUTPATIENT
Start: 2018-09-25 | End: 2018-09-25

## 2018-09-25 RX ORDER — CARBIDOPA AND LEVODOPA 25; 100 MG/1; MG/1
1 TABLET ORAL
Qty: 0 | Refills: 0 | Status: DISCONTINUED | OUTPATIENT
Start: 2018-09-25 | End: 2018-09-27

## 2018-09-25 RX ADMIN — Medication 40 MILLIEQUIVALENT(S): at 12:49

## 2018-09-25 RX ADMIN — Medication 200 MILLIGRAM(S): at 11:54

## 2018-09-25 RX ADMIN — SODIUM CHLORIDE 50 MILLILITER(S): 9 INJECTION, SOLUTION INTRAVENOUS at 22:03

## 2018-09-25 RX ADMIN — PIPERACILLIN AND TAZOBACTAM 25 GRAM(S): 4; .5 INJECTION, POWDER, LYOPHILIZED, FOR SOLUTION INTRAVENOUS at 06:37

## 2018-09-25 RX ADMIN — FLUCONAZOLE 50 MILLIGRAM(S): 150 TABLET ORAL at 09:51

## 2018-09-25 RX ADMIN — PIPERACILLIN AND TAZOBACTAM 25 GRAM(S): 4; .5 INJECTION, POWDER, LYOPHILIZED, FOR SOLUTION INTRAVENOUS at 22:03

## 2018-09-25 RX ADMIN — Medication 250 MILLIGRAM(S): at 05:13

## 2018-09-25 RX ADMIN — POTASSIUM PHOSPHATE, MONOBASIC POTASSIUM PHOSPHATE, DIBASIC 63.75 MILLIMOLE(S): 236; 224 INJECTION, SOLUTION INTRAVENOUS at 17:23

## 2018-09-25 RX ADMIN — SODIUM CHLORIDE 50 MILLILITER(S): 9 INJECTION, SOLUTION INTRAVENOUS at 05:12

## 2018-09-25 RX ADMIN — MIRTAZAPINE 15 MILLIGRAM(S): 45 TABLET, ORALLY DISINTEGRATING ORAL at 22:03

## 2018-09-25 RX ADMIN — ESCITALOPRAM OXALATE 5 MILLIGRAM(S): 10 TABLET, FILM COATED ORAL at 11:54

## 2018-09-25 RX ADMIN — CARBIDOPA AND LEVODOPA 1 TABLET(S): 25; 100 TABLET ORAL at 09:51

## 2018-09-25 RX ADMIN — PIPERACILLIN AND TAZOBACTAM 25 GRAM(S): 4; .5 INJECTION, POWDER, LYOPHILIZED, FOR SOLUTION INTRAVENOUS at 13:33

## 2018-09-25 RX ADMIN — CARBIDOPA AND LEVODOPA 1 TABLET(S): 25; 100 TABLET ORAL at 17:23

## 2018-09-25 RX ADMIN — HEPARIN SODIUM 5000 UNIT(S): 5000 INJECTION INTRAVENOUS; SUBCUTANEOUS at 05:13

## 2018-09-25 RX ADMIN — HEPARIN SODIUM 5000 UNIT(S): 5000 INJECTION INTRAVENOUS; SUBCUTANEOUS at 17:22

## 2018-09-25 RX ADMIN — PANTOPRAZOLE SODIUM 40 MILLIGRAM(S): 20 TABLET, DELAYED RELEASE ORAL at 11:54

## 2018-09-25 NOTE — PROGRESS NOTE ADULT - SUBJECTIVE AND OBJECTIVE BOX
INTERVAL HPI/OVERNIGHT EVENTS:  Pt. seen and examined at bedside resting comfortably. Patient denies pain or complaints. Tolerating clears, denies N/V. Downgraded from CCU yesterday, lambert cath removed and voiding well. States she has not been out of bed yet.   Confused overnight per RN.   Ostomy functioning.   Denies fever/chills, chest pain, dyspnea, cough, dizziness.     Vital Signs Last 24 Hrs  T(C): 36.2 (25 Sep 2018 05:00), Max: 36.2 (24 Sep 2018 23:15)  T(F): 97.1 (25 Sep 2018 05:00), Max: 97.1 (24 Sep 2018 23:15)  HR: 61 (25 Sep 2018 05:00) (61 - 120)  BP: 120/86 (25 Sep 2018 05:00) (100/85 - 132/78)  BP(mean): 82 (24 Sep 2018 22:00) (70 - 89)  RR: 16 (25 Sep 2018 05:00) (12 - 38)  SpO2: 98% (25 Sep 2018 05:00) (92% - 100%)    PHYSICAL EXAM:    GENERAL: NAD  CHEST/LUNG: Clear to ausculation, bilaterally   HEART: S1S2, RRR  ABDOMEN: Dressing and packing changed, wound clean and dry, staples intact, no drainage or bleeding. Wound irrigated with NS. Ostomy pink and viable, functioning, liquid stool in bag. Nondistended. nontender, no guarding, no rebound.   EXTREMITIES:  calf soft, non tender b/l. 2+ distal pulses b/l.     I&O's Detail    23 Sep 2018 07:01  -  24 Sep 2018 07:00  --------------------------------------------------------  IN:    dexmedetomidine Infusion: 3.1 mL    Lactated Ringers IV Bolus: 1000 mL    lactated ringers.: 2300 mL    norepinephrine Infusion: 41.6 mL    Solution: 50 mL    Solution: 300 mL    Solution: 500 mL  Total IN: 4194.7 mL    OUT:    Colostomy: 35 mL    Indwelling Catheter - Urethral: 675 mL    Nasoenteral Tube: 150 mL  Total OUT: 860 mL    Total NET: 3334.7 mL      24 Sep 2018 07:01  -  25 Sep 2018 05:58  --------------------------------------------------------  IN:    dextrose 5%.: 500 mL    lactated ringers.: 200 mL    Solution: 50 mL    Solution: 250 mL    Solution: 100 mL  Total IN: 1100 mL    OUT:    Colostomy: 50 mL    Indwelling Catheter - Urethral: 680 mL    Voided: 250 mL  Total OUT: 980 mL    Total NET: 120 mL      LABS:                        9.4    7.66  )-----------( 189      ( 24 Sep 2018 03:34 )             29.8     09-24    141  |  106  |  15  ----------------------------<  94  3.4<L>   |  25  |  0.53    Ca    6.8<L>      24 Sep 2018 03:34  Phos  1.5     09-24  Mg     1.7     09-24      Culture Results:   No growth (09-23 @ 17:59)  Culture Results:   Few Aeromonas hydrophila/caviae  Few Klebsiella pneumoniae (09-23 @ 10:42)  Culture Results:   Few Aeromonas hydrophila/caviae  Few Escherichia coli (09-23 @ 10:42)  Culture Results:   No growth to date. (09-22 @ 23:36)  Culture Results:   No growth to date. (09-22 @ 23:36)    Impression: 85y Female admitted with Abdominal pain POD #2 S/P Exploratory Laparotomy Sigmoid resection with Hartmans procedure, Peritoneal lavage   secondary to Sigmoid perforation (likely perforated diverticulitis), Diffuse purulent peritonitis     PMH Rheumatoid arthritis    Plan:  - Continue Antibiotics, OR culture prelim results reviewed, await sensitivities and f/u ID consult   - Ostomy functioning, tolerating clear liquids, abdomen benign --> advance to full liquids  - PT consult, increase activity, OOB  - Continue VTE prophylaxis with SQ heparin, venodynes  - Local wound care by surgical team  - Ostomy care, monitor output  - Continue medical management and supportive care  - Home meds resumed  - f/u AM labs  - will discuss with surgical attending

## 2018-09-25 NOTE — PROGRESS NOTE ADULT - ASSESSMENT
85y Female with PMH: RA,  admitted with Abdominal pain 1 S/P Exploratory Laparotomy Sigmoid resection with Hartmans procedure, Peritoneal lavage   secondary to Sigmoid perforation (likely perforated diverticulitis) found to have diffuse purulent peritonitis.  Pt on BS IV abx and antifungal treatment.  Pt extubated successfully to NC yesterday and tolerating NC.  Pt transiently on pressors yesterday likely secondary to septic shock.   Will defer to surgery re: tube feeds vs PO intake of meds/diet.       Problem/Recommendation - 1:  Problem: Bowel perforation. Recommendation: severe sepsis with septic shock,  C/w Surgical management  ID consult for management of IV antibiotics.     Problem/Recommendation - 2:  ·  Problem: Acute blood loss anemia.  Recommendation: monitor hemoglobin   transfuse for HgB<8.      Problem/Recommendation - 3:  ·  Problem: Hypokalemia.  Recommendation: replete.      Problem/Recommendation - 4:  ·  Problem: Hypophosphatemia.  Recommendation: replete.      Problem/Recommendation - 5:  ·  Problem: Severe protein-calorie malnutrition.  Recommendation: nutrition eval, when cleared by surgery.

## 2018-09-25 NOTE — PHYSICAL THERAPY INITIAL EVALUATION ADULT - PERTINENT HX OF CURRENT PROBLEM, REHAB EVAL
85F admitted with small bowel ischemia, bowel perforation. Pt was sent to OR for emergent procedure.

## 2018-09-25 NOTE — PHYSICAL THERAPY INITIAL EVALUATION ADULT - ADDITIONAL COMMENTS
Pt lives alone in an apartment that has 8 steps to enter with 1 handrail. Once inside pt lives on main level, no further steps to negotiate.  Pt does not have/use assistive devices, her family assists with shopping. pt ambulates short distances in the community. pt is R hand dominant, use is limited of B hands secondary to RA however pt is independent with dressing and self care.

## 2018-09-25 NOTE — PHYSICAL THERAPY INITIAL EVALUATION ADULT - GENERAL OBSERVATIONS, REHAB EVAL
Pt encountered supine in bed, alert and oriented x4, iv and ostomy in place, NAD. Pt is POD #2 S/P Exploratory Laparotomy Sigmoid resection with Hartmans procedure, Peritoneal lavage

## 2018-09-26 ENCOUNTER — TRANSCRIPTION ENCOUNTER (OUTPATIENT)
Age: 83
End: 2018-09-26

## 2018-09-26 LAB
-  AMIKACIN: SIGNIFICANT CHANGE UP
-  AMOXICILLIN/CLAVULANIC ACID: SIGNIFICANT CHANGE UP
-  AMOXICILLIN/CLAVULANIC ACID: SIGNIFICANT CHANGE UP
-  AMPICILLIN/SULBACTAM: SIGNIFICANT CHANGE UP
-  AMPICILLIN: SIGNIFICANT CHANGE UP
-  AMPICILLIN: SIGNIFICANT CHANGE UP
-  AZTREONAM: SIGNIFICANT CHANGE UP
-  CEFAZOLIN: SIGNIFICANT CHANGE UP
-  CEFEPIME: SIGNIFICANT CHANGE UP
-  CEFOXITIN: SIGNIFICANT CHANGE UP
-  CEFTAZIDIME: SIGNIFICANT CHANGE UP
-  CEFTRIAXONE: SIGNIFICANT CHANGE UP
-  CIPROFLOXACIN: SIGNIFICANT CHANGE UP
-  ERTAPENEM: SIGNIFICANT CHANGE UP
-  ERTAPENEM: SIGNIFICANT CHANGE UP
-  GENTAMICIN: SIGNIFICANT CHANGE UP
-  IMIPENEM: SIGNIFICANT CHANGE UP
-  IMIPENEM: SIGNIFICANT CHANGE UP
-  LEVOFLOXACIN: SIGNIFICANT CHANGE UP
-  MEROPENEM: SIGNIFICANT CHANGE UP
-  PIPERACILLIN/TAZOBACTAM: SIGNIFICANT CHANGE UP
-  TOBRAMYCIN: SIGNIFICANT CHANGE UP
-  TOBRAMYCIN: SIGNIFICANT CHANGE UP
-  TRIMETHOPRIM/SULFAMETHOXAZOLE: SIGNIFICANT CHANGE UP
ANION GAP SERPL CALC-SCNC: 9 MMOL/L — SIGNIFICANT CHANGE UP (ref 5–17)
BUN SERPL-MCNC: 5 MG/DL — LOW (ref 7–23)
CALCIUM SERPL-MCNC: 6.3 MG/DL — CRITICAL LOW (ref 8.5–10.1)
CHLORIDE SERPL-SCNC: 110 MMOL/L — HIGH (ref 96–108)
CO2 SERPL-SCNC: 23 MMOL/L — SIGNIFICANT CHANGE UP (ref 22–31)
CREAT SERPL-MCNC: 0.44 MG/DL — LOW (ref 0.5–1.3)
CULTURE RESULTS: SIGNIFICANT CHANGE UP
CULTURE RESULTS: SIGNIFICANT CHANGE UP
GLUCOSE SERPL-MCNC: 100 MG/DL — HIGH (ref 70–99)
GRAM STN FLD: SIGNIFICANT CHANGE UP
GRAM STN FLD: SIGNIFICANT CHANGE UP
HCT VFR BLD CALC: 32.6 % — LOW (ref 34.5–45)
HGB BLD-MCNC: 10.6 G/DL — LOW (ref 11.5–15.5)
MCHC RBC-ENTMCNC: 31 PG — SIGNIFICANT CHANGE UP (ref 27–34)
MCHC RBC-ENTMCNC: 32.5 GM/DL — SIGNIFICANT CHANGE UP (ref 32–36)
MCV RBC AUTO: 95.3 FL — SIGNIFICANT CHANGE UP (ref 80–100)
METHOD TYPE: SIGNIFICANT CHANGE UP
NRBC # BLD: 0 /100 WBCS — SIGNIFICANT CHANGE UP (ref 0–0)
ORGANISM # SPEC MICROSCOPIC CNT: SIGNIFICANT CHANGE UP
PLATELET # BLD AUTO: 197 K/UL — SIGNIFICANT CHANGE UP (ref 150–400)
POTASSIUM SERPL-MCNC: 4 MMOL/L — SIGNIFICANT CHANGE UP (ref 3.5–5.3)
POTASSIUM SERPL-SCNC: 4 MMOL/L — SIGNIFICANT CHANGE UP (ref 3.5–5.3)
RBC # BLD: 3.42 M/UL — LOW (ref 3.8–5.2)
RBC # FLD: 14 % — SIGNIFICANT CHANGE UP (ref 10.3–14.5)
SODIUM SERPL-SCNC: 142 MMOL/L — SIGNIFICANT CHANGE UP (ref 135–145)
SPECIMEN SOURCE: SIGNIFICANT CHANGE UP
SPECIMEN SOURCE: SIGNIFICANT CHANGE UP
WBC # BLD: 6.82 K/UL — SIGNIFICANT CHANGE UP (ref 3.8–10.5)
WBC # FLD AUTO: 6.82 K/UL — SIGNIFICANT CHANGE UP (ref 3.8–10.5)

## 2018-09-26 PROCEDURE — 99232 SBSQ HOSP IP/OBS MODERATE 35: CPT

## 2018-09-26 RX ADMIN — Medication 200 MILLIGRAM(S): at 12:49

## 2018-09-26 RX ADMIN — PANTOPRAZOLE SODIUM 40 MILLIGRAM(S): 20 TABLET, DELAYED RELEASE ORAL at 12:48

## 2018-09-26 RX ADMIN — HEPARIN SODIUM 5000 UNIT(S): 5000 INJECTION INTRAVENOUS; SUBCUTANEOUS at 17:52

## 2018-09-26 RX ADMIN — PIPERACILLIN AND TAZOBACTAM 25 GRAM(S): 4; .5 INJECTION, POWDER, LYOPHILIZED, FOR SOLUTION INTRAVENOUS at 22:10

## 2018-09-26 RX ADMIN — HEPARIN SODIUM 5000 UNIT(S): 5000 INJECTION INTRAVENOUS; SUBCUTANEOUS at 05:45

## 2018-09-26 RX ADMIN — ESCITALOPRAM OXALATE 5 MILLIGRAM(S): 10 TABLET, FILM COATED ORAL at 12:49

## 2018-09-26 RX ADMIN — CARBIDOPA AND LEVODOPA 1 TABLET(S): 25; 100 TABLET ORAL at 17:52

## 2018-09-26 RX ADMIN — PIPERACILLIN AND TAZOBACTAM 25 GRAM(S): 4; .5 INJECTION, POWDER, LYOPHILIZED, FOR SOLUTION INTRAVENOUS at 15:40

## 2018-09-26 RX ADMIN — PIPERACILLIN AND TAZOBACTAM 25 GRAM(S): 4; .5 INJECTION, POWDER, LYOPHILIZED, FOR SOLUTION INTRAVENOUS at 05:45

## 2018-09-26 RX ADMIN — SODIUM CHLORIDE 50 MILLILITER(S): 9 INJECTION, SOLUTION INTRAVENOUS at 05:45

## 2018-09-26 RX ADMIN — CARBIDOPA AND LEVODOPA 1 TABLET(S): 25; 100 TABLET ORAL at 12:48

## 2018-09-26 RX ADMIN — FLUCONAZOLE 50 MILLIGRAM(S): 150 TABLET ORAL at 10:26

## 2018-09-26 RX ADMIN — MIRTAZAPINE 15 MILLIGRAM(S): 45 TABLET, ORALLY DISINTEGRATING ORAL at 22:09

## 2018-09-26 NOTE — PROGRESS NOTE ADULT - SUBJECTIVE AND OBJECTIVE BOX
Patient is a 85y old  Female who presents with a chief complaint of Abdominal pain (27 Sep 2018 05:25)      INTERVAL HPI / OVERNIGHT EVENTS: doing better, started on oral     MEDICATIONS  (STANDING):  carbidopa/levodopa  25/100 1 Tablet(s) Oral <User Schedule>  dextrose 5%. 1000 milliLiter(s) (50 mL/Hr) IV Continuous <Continuous>  escitalopram 5 milliGRAM(s) Oral daily  heparin  Injectable 5000 Unit(s) SubCutaneous every 12 hours  hydroxychloroquine 200 milliGRAM(s) Oral daily  influenza   Vaccine 0.5 milliLiter(s) IntraMuscular once  mirtazapine 15 milliGRAM(s) Oral at bedtime  pantoprazole  Injectable 40 milliGRAM(s) IV Push daily  piperacillin/tazobactam IVPB. 3.375 Gram(s) IV Intermittent every 8 hours    MEDICATIONS  (PRN):  acetaminophen   Tablet .. 650 milliGRAM(s) Oral every 6 hours PRN Temp greater or equal to 38C (100.4F), Mild Pain (1 - 3)  morphine  - Injectable 2 milliGRAM(s) IV Push every 4 hours PRN Severe Pain (7 - 10)  ondansetron Injectable 4 milliGRAM(s) IV Push every 6 hours PRN Nausea and/or Vomiting  oxyCODONE    5 mG/acetaminophen 325 mG 1 Tablet(s) Oral every 4 hours PRN Moderate Pain (4 - 6)      Vital Signs Last 24 Hrs  Tmax :afebrile    PHYSICAL EXAM:  General :NAD  Constitutional:  thin built  Respiratory: CTAB/L  Cardiovascular: S1 and S2, RRR, no M/G/R  Gastrointestinal: s/p chung procedure  Extremities: No peripheral edema  Vascular: 2+ peripheral pulses  Skin: No rashes      LABS:             WBC WNL          MICROBIOLOGY:  RECENT CULTURES:  09-23 .Urine Clean Catch (Midstream) XXXX XXXX   No growth    09-23 Abdominal Fl intra abdominal fluid c/s Aeromonas hydrophila/caviae  Klebsiella pneumoniae  Escherichia coli   Rare Gram Negative Rods per oil power field  Rare Gram positive cocci in pairs per oil power field  Moderate polymorphonuclear leukocytes seen per low power field   Few Klebsiella pneumoniae  Few Escherichia coli  Few Aeromonas hydrophila/caviae  Few Alpha hemolytic strep "Susceptibilities not performed"  Few Eggerthella lenta "Susceptibilities not performed"    09-22 .Blood Blood-Peripheral XXXX XXXX   No growth to date.          RADIOLOGY & ADDITIONAL STUDIES:

## 2018-09-26 NOTE — DISCHARGE NOTE ADULT - HOSPITAL COURSE
84 y/o female admitted with sigmoid colon perforation S/P Lebron's procedure. Operation went well. Patient tolerated advancement of diet and had good bowel function. Medicine and ID were consulted.     At the time of discharge, the patient was hemodynamically stable, was tolerating PO diet, was voiding urine, was ambulating, and was comfortable with adequate pain control. No nausea, vomiting, fever, chills. Patient instructed to follow up and to call the office to make an appointment. Patient instructed to call for any fever over 101, nausea, vomiting, severe pain, no passing of gas or bowel movement. Patient understood.

## 2018-09-26 NOTE — CHART NOTE - NSCHARTNOTEFT_GEN_A_CORE
Pt seen bedside for dressing change.  In NAD, seated in chair.  Midline abdominal wound with staples, well approximated with no active drainage or bleeding. Iodoform packing from mid incision removed. Irrigated with saline and new 1/4" iodoform packing placed. Dry dressing applied. Continue local colostomy care and teaching.  Discharge planning to rehab likely tomorrow, discussed with patient.

## 2018-09-26 NOTE — DISCHARGE NOTE ADULT - PLAN OF CARE
Post-op care Follow up in 7-10 days. Please call the office to make an appointment. Activity as tolerated. Rest as needed. You may eat a low fiber diet. Do not lift anything heavier than 10 pounds. You may take motrin or advil for mild pain as needed, in addition to prescribed narcotic medication. Do not drive while taking narcotic pain medication. You may take over the counter stool softeners as needed. Call for any fever over 101, nausea, vomiting, severe pain, no passing of gas or bowel movement. You may shower and pat dry abdomen. Follow up with your primary care physician.

## 2018-09-26 NOTE — PROGRESS NOTE ADULT - SUBJECTIVE AND OBJECTIVE BOX
Patient is a 85y old  Female who presents with a chief complaint of Abdominal pain (26 Sep 2018 06:16)      INTERVAL HPI/OVERNIGHT EVENTS: no events doing well      MEDICATIONS  (STANDING):  carbidopa/levodopa  25/100 1 Tablet(s) Oral <User Schedule>  dextrose 5%. 1000 milliLiter(s) (50 mL/Hr) IV Continuous <Continuous>  escitalopram 5 milliGRAM(s) Oral daily  fluconAZOLE IVPB 100 milliGRAM(s) IV Intermittent every 24 hours  fluconAZOLE IVPB      heparin  Injectable 5000 Unit(s) SubCutaneous every 12 hours  hydroxychloroquine 200 milliGRAM(s) Oral daily  influenza   Vaccine 0.5 milliLiter(s) IntraMuscular once  mirtazapine 15 milliGRAM(s) Oral at bedtime  pantoprazole  Injectable 40 milliGRAM(s) IV Push daily  piperacillin/tazobactam IVPB. 3.375 Gram(s) IV Intermittent every 8 hours    MEDICATIONS  (PRN):  acetaminophen   Tablet .. 650 milliGRAM(s) Oral every 6 hours PRN Temp greater or equal to 38C (100.4F), Mild Pain (1 - 3)  morphine  - Injectable 2 milliGRAM(s) IV Push every 4 hours PRN Severe Pain (7 - 10)  ondansetron Injectable 4 milliGRAM(s) IV Push every 6 hours PRN Nausea and/or Vomiting  oxyCODONE    5 mG/acetaminophen 325 mG 1 Tablet(s) Oral every 4 hours PRN Moderate Pain (4 - 6)      Allergies    No Known Allergies    Intolerances             Vital Signs Last 24 Hrs  T(C): 36.2 (26 Sep 2018 05:00), Max: 36.6 (25 Sep 2018 23:42)  T(F): 97.2 (26 Sep 2018 05:00), Max: 97.8 (25 Sep 2018 23:42)  HR: 95 (26 Sep 2018 05:00) (91 - 95)  BP: 130/74 (26 Sep 2018 05:00) (112/71 - 130/74)  BP(mean): --  RR: 16 (26 Sep 2018 05:00) (16 - 17)  SpO2: 97% (26 Sep 2018 05:00) (96% - 98%)    PHYSICAL EXAM:  GENERAL: NAD, well-groomed, well-developed  HEAD:  Atraumatic, Normocephalic  EYES: EOMI, PERRLA, conjunctiva and sclera clear  ENMT: No tonsillar erythema, exudates, or enlargement; Moist mucous membranes, Good dentition, No lesions  NECK: Supple, No JVD, Normal thyroid  NERVOUS SYSTEM:  Alert & Oriented X3, Good concentration; Motor Strength 5/5 B/L upper and lower extremities; DTRs 2+ intact and symmetric  CHEST/LUNG: Clear to percussion bilaterally; No rales, rhonchi, wheezing, or rubs  HEART: Regular rate and rhythm; No murmurs, rubs, or gallops  ABDOMEN: Soft, Nontender, Nondistended; Bowel sounds present  EXTREMITIES:  2+ Peripheral Pulses, No clubbing, cyanosis, or edema  LYMPH: No lymphadenopathy noted  SKIN: No rashes or lesions    LABS:                        10.6   6.82  )-----------( 197      ( 26 Sep 2018 06:39 )             32.6     09-26    142  |  110<H>  |  5<L>  ----------------------------<  100<H>  4.0   |  23  |  0.44<L>    Ca    6.3<LL>      26 Sep 2018 06:39  Phos  1.1     09-25  Mg     1.8     09-25          CAPILLARY BLOOD GLUCOSE          RADIOLOGY & ADDITIONAL TESTS:    Imaging Personally Reviewed:  [ X] YES  [ ] NO    Consultant(s) Notes Reviewed:  [ X] YES  [ ] NO    Care Discussed with Consultants/Other Providers [X ] YES  [ ] NO

## 2018-09-26 NOTE — PROGRESS NOTE ADULT - ASSESSMENT
85 yr old female was admitted with abdominal pain and found to have bowel perforation with sigmoid diverticulitis ,underwent emergent exlap with chung procedure .C/s growing gram negative and aeromonas .Pt on vanco zosyn and empiric diflucan   continues to do ok ,seen today with

## 2018-09-26 NOTE — PROGRESS NOTE ADULT - ATTENDING COMMENTS
Patient seen/examined.  Agree w above note and plan and have discussed plan w house staff.  No complaints.  Abdomen soft. wound clean.   Liquid stool, gas in bag.  Advance to full liqs    Maged Tenorio MD
I saw and examined the pt and discussed the tx plan with the House Staff. I agree with the SPA's note from today whch I edited as indicated.  Intubated, comfortable. Abdomen soft, mildly distended, NT.   Colostomy pink.  Continue ICU care.  Add Diflucan to the Zosyn, given intraop findings c/w perforation present likely for days.  Angela Garza MD
polymicrobial peritonitis all covered with zoysn ,cont zoysn for now  vanco has been dced and diflucan beign stopped today

## 2018-09-26 NOTE — PROGRESS NOTE ADULT - SUBJECTIVE AND OBJECTIVE BOX
Patient seen and examined bedside resting comfortably.  No complaints offered.   Abdominal pain is well controlled.  Denies nausea, vomiting. Tolerating diet.  No flatus/BM.   Denies chest pain, dyspnea, cough.    Vital Signs Last 24 Hrs  T(C): 36.2 (26 Sep 2018 05:00), Max: 36.8 (25 Sep 2018 12:01)  T(F): 97.2 (26 Sep 2018 05:00), Max: 98.2 (25 Sep 2018 12:01)  HR: 95 (26 Sep 2018 05:00) (89 - 95)  BP: 130/74 (26 Sep 2018 05:00) (106/68 - 130/74)  BP(mean): --  RR: 16 (26 Sep 2018 05:00) (16 - 18)  SpO2: 97% (26 Sep 2018 05:00) (95% - 98%)  I&O's Summary    24 Sep 2018 07:01  -  25 Sep 2018 07:00  --------------------------------------------------------  IN: 1750 mL / OUT: 1830 mL / NET: -80 mL    25 Sep 2018 07:01  -  26 Sep 2018 06:48  --------------------------------------------------------  IN: 1980 mL / OUT: 125 mL / NET: 1855 mL        PHYSICAL EXAM:  GENERAL: NAD  CHEST/LUNG: Clear to ausculation, bilaterally   HEART: S1S2, RRR  ABDOMEN: Dressing and packing changed, wound clean and dry, staples intact, no drainage or bleeding. Wound irrigated with NS. Ostomy pink and viable, functioning, liquid and some semi-formed stool in bag. Nondistended. nontender, no guarding, no rebound.   EXTREMITIES:  calf soft, non tender b/l. 2+ distal pulses b/l.       LABS:             Culture Results:   No growth (09-23 @ 17:59)  Culture Results:   Few Klebsiella pneumoniae  Few Escherichia coli  Few Aeromonas hydrophila/caviae (09-23 @ 10:42)  Culture Results:   Few Aeromonas hydrophila/caviae  Few Escherichia coli  Growth in fluid media only Klebsiella pneumoniae  Growth in fluid media only Lactococcus species "Susceptibilities not  performed" (09-23 @ 10:42)        Impression: 85y Female admitted with Abdominal pain POD #3 S/P Exploratory Laparotomy Sigmoid resection with Hartmans procedure, Peritoneal lavage   secondary to Sigmoid perforation (likely perforated diverticulitis), Diffuse purulent peritonitis     PMH Rheumatoid arthritis    Plan:  - Continue Antibiotics, OR culture prelim results reviewed, await sensitivities and f/u ID consult   - Cont low residue diet  - Cont PT, increase activity, OOB  - Continue VTE prophylaxis with SQ heparin, venodynes  - Local wound care by surgical team  - Ostomy care, monitor output  - Continue medical management and supportive care  - f/u AM labs  - d/c planning  - will discuss with surgical attending

## 2018-09-26 NOTE — DISCHARGE NOTE ADULT - MEDICATION SUMMARY - MEDICATIONS TO TAKE
I will START or STAY ON the medications listed below when I get home from the hospital:    oxyCODONE-acetaminophen 5 mg-325 mg oral tablet  -- 1 tab(s) by mouth every 4 hours, As needed, Moderate Pain (4 - 6)  -- Indication: For pain    mirtazapine 15 mg oral tablet  -- 1 tab(s) by mouth once a day (at bedtime)  -- Indication: For depression    Lexapro 5 mg oral tablet  -- 1 tab(s) by mouth once a day  -- Indication: For depression    Plaquenil 200 mg oral tablet  -- 1 tab(s) by mouth once a day  -- Indication: For Rheumatoid arthritis    Sinemet 25 mg-100 mg oral tablet  -- 1 tab(s) by mouth 2 times a day  -- Indication: For parkinsons

## 2018-09-26 NOTE — DISCHARGE NOTE ADULT - CARE PROVIDER_API CALL
Samson Urbano (MD), Surgery  [address] [facility]  [address] [place] [id number]  Phone: [phone]  Fax: [phone]

## 2018-09-26 NOTE — PROGRESS NOTE ADULT - ASSESSMENT
85y Female with PMH: RA,  admitted with Abdominal pain 1 S/P Exploratory Laparotomy Sigmoid resection with Hartmans procedure, Peritoneal lavage   secondary to Sigmoid perforation (likely perforated diverticulitis) found to have diffuse purulent peritonitis.  Pt on BS IV abx and antifungal treatment.  Pt extubated successfully to NC yesterday and tolerating NC.  Pt transiently on pressors yesterday likely secondary to septic shock.           Problem/Recommendation - 1:  Problem: Bowel perforation. Recommendation: severe sepsis with septic shock,  C/w Surgical management  ID on board  Pt tolerating PO     Problem/Recommendation - 2:  ·  Problem: Acute blood loss anemia.  Recommendation: monitor hemoglobin   transfuse for HgB<8.      Problem/Recommendation - 3:  ·  Problem: Hypokalemia.  Recommendation: replete.      Problem/Recommendation - 4:  ·  Problem: Hypophosphatemia.  Recommendation: replete.      Problem/Recommendation - 5:  ·  Problem: Severe protein-calorie malnutrition.  Recommendation: nutrition eval

## 2018-09-26 NOTE — DISCHARGE NOTE ADULT - CARE PLAN
Principal Discharge DX:	Bowel perforation  Goal:	Post-op care  Assessment and plan of treatment:	Follow up in 7-10 days. Please call the office to make an appointment. Activity as tolerated. Rest as needed. You may eat a low fiber diet. Do not lift anything heavier than 10 pounds. You may take motrin or advil for mild pain as needed, in addition to prescribed narcotic medication. Do not drive while taking narcotic pain medication. You may take over the counter stool softeners as needed. Call for any fever over 101, nausea, vomiting, severe pain, no passing of gas or bowel movement. You may shower and pat dry abdomen.  Secondary Diagnosis:	Rheumatoid arthritis  Assessment and plan of treatment:	Follow up with your primary care physician.

## 2018-09-27 VITALS
DIASTOLIC BLOOD PRESSURE: 71 MMHG | OXYGEN SATURATION: 96 % | HEART RATE: 91 BPM | RESPIRATION RATE: 17 BRPM | TEMPERATURE: 98 F | SYSTOLIC BLOOD PRESSURE: 128 MMHG

## 2018-09-27 LAB
ALBUMIN SERPL ELPH-MCNC: 1.9 G/DL — LOW (ref 3.3–5)
ALP SERPL-CCNC: 53 U/L — SIGNIFICANT CHANGE UP (ref 40–120)
ALT FLD-CCNC: 9 U/L — LOW (ref 12–78)
ANION GAP SERPL CALC-SCNC: 8 MMOL/L — SIGNIFICANT CHANGE UP (ref 5–17)
AST SERPL-CCNC: 20 U/L — SIGNIFICANT CHANGE UP (ref 15–37)
BILIRUB SERPL-MCNC: 0.3 MG/DL — SIGNIFICANT CHANGE UP (ref 0.2–1.2)
BUN SERPL-MCNC: 5 MG/DL — LOW (ref 7–23)
CALCIUM SERPL-MCNC: 6.5 MG/DL — CRITICAL LOW (ref 8.5–10.1)
CHLORIDE SERPL-SCNC: 107 MMOL/L — SIGNIFICANT CHANGE UP (ref 96–108)
CO2 SERPL-SCNC: 30 MMOL/L — SIGNIFICANT CHANGE UP (ref 22–31)
CREAT SERPL-MCNC: 0.55 MG/DL — SIGNIFICANT CHANGE UP (ref 0.5–1.3)
GLUCOSE SERPL-MCNC: 104 MG/DL — HIGH (ref 70–99)
HCT VFR BLD CALC: 30.2 % — LOW (ref 34.5–45)
HGB BLD-MCNC: 9.8 G/DL — LOW (ref 11.5–15.5)
MCHC RBC-ENTMCNC: 31.2 PG — SIGNIFICANT CHANGE UP (ref 27–34)
MCHC RBC-ENTMCNC: 32.5 GM/DL — SIGNIFICANT CHANGE UP (ref 32–36)
MCV RBC AUTO: 96.2 FL — SIGNIFICANT CHANGE UP (ref 80–100)
NRBC # BLD: 0 /100 WBCS — SIGNIFICANT CHANGE UP (ref 0–0)
PLATELET # BLD AUTO: 198 K/UL — SIGNIFICANT CHANGE UP (ref 150–400)
POTASSIUM SERPL-MCNC: 3.2 MMOL/L — LOW (ref 3.5–5.3)
POTASSIUM SERPL-SCNC: 3.2 MMOL/L — LOW (ref 3.5–5.3)
PROT SERPL-MCNC: 5.1 GM/DL — LOW (ref 6–8.3)
RBC # BLD: 3.14 M/UL — LOW (ref 3.8–5.2)
RBC # FLD: 14.2 % — SIGNIFICANT CHANGE UP (ref 10.3–14.5)
SODIUM SERPL-SCNC: 145 MMOL/L — SIGNIFICANT CHANGE UP (ref 135–145)
WBC # BLD: 7.25 K/UL — SIGNIFICANT CHANGE UP (ref 3.8–10.5)
WBC # FLD AUTO: 7.25 K/UL — SIGNIFICANT CHANGE UP (ref 3.8–10.5)

## 2018-09-27 PROCEDURE — 99232 SBSQ HOSP IP/OBS MODERATE 35: CPT

## 2018-09-27 PROCEDURE — 36569 INSJ PICC 5 YR+ W/O IMAGING: CPT

## 2018-09-27 RX ORDER — CHLORHEXIDINE GLUCONATE 213 G/1000ML
1 SOLUTION TOPICAL
Qty: 0 | Refills: 0 | Status: DISCONTINUED | OUTPATIENT
Start: 2018-09-27 | End: 2018-09-27

## 2018-09-27 RX ORDER — POTASSIUM CHLORIDE 20 MEQ
40 PACKET (EA) ORAL ONCE
Qty: 0 | Refills: 0 | Status: COMPLETED | OUTPATIENT
Start: 2018-09-27 | End: 2018-09-27

## 2018-09-27 RX ORDER — POTASSIUM PHOSPHATE, MONOBASIC POTASSIUM PHOSPHATE, DIBASIC 236; 224 MG/ML; MG/ML
15 INJECTION, SOLUTION INTRAVENOUS ONCE
Qty: 0 | Refills: 0 | Status: COMPLETED | OUTPATIENT
Start: 2018-09-27 | End: 2018-09-27

## 2018-09-27 RX ADMIN — PANTOPRAZOLE SODIUM 40 MILLIGRAM(S): 20 TABLET, DELAYED RELEASE ORAL at 11:25

## 2018-09-27 RX ADMIN — CARBIDOPA AND LEVODOPA 1 TABLET(S): 25; 100 TABLET ORAL at 11:25

## 2018-09-27 RX ADMIN — Medication 40 MILLIEQUIVALENT(S): at 08:46

## 2018-09-27 RX ADMIN — PIPERACILLIN AND TAZOBACTAM 25 GRAM(S): 4; .5 INJECTION, POWDER, LYOPHILIZED, FOR SOLUTION INTRAVENOUS at 11:30

## 2018-09-27 RX ADMIN — CARBIDOPA AND LEVODOPA 1 TABLET(S): 25; 100 TABLET ORAL at 18:03

## 2018-09-27 RX ADMIN — PIPERACILLIN AND TAZOBACTAM 25 GRAM(S): 4; .5 INJECTION, POWDER, LYOPHILIZED, FOR SOLUTION INTRAVENOUS at 05:25

## 2018-09-27 RX ADMIN — POTASSIUM PHOSPHATE, MONOBASIC POTASSIUM PHOSPHATE, DIBASIC 63.75 MILLIMOLE(S): 236; 224 INJECTION, SOLUTION INTRAVENOUS at 10:38

## 2018-09-27 RX ADMIN — SODIUM CHLORIDE 50 MILLILITER(S): 9 INJECTION, SOLUTION INTRAVENOUS at 05:25

## 2018-09-27 RX ADMIN — SODIUM CHLORIDE 50 MILLILITER(S): 9 INJECTION, SOLUTION INTRAVENOUS at 10:17

## 2018-09-27 RX ADMIN — Medication 200 MILLIGRAM(S): at 11:25

## 2018-09-27 RX ADMIN — HEPARIN SODIUM 5000 UNIT(S): 5000 INJECTION INTRAVENOUS; SUBCUTANEOUS at 18:03

## 2018-09-27 RX ADMIN — ESCITALOPRAM OXALATE 5 MILLIGRAM(S): 10 TABLET, FILM COATED ORAL at 11:25

## 2018-09-27 RX ADMIN — HEPARIN SODIUM 5000 UNIT(S): 5000 INJECTION INTRAVENOUS; SUBCUTANEOUS at 05:25

## 2018-09-27 NOTE — PROGRESS NOTE ADULT - SUBJECTIVE AND OBJECTIVE BOX
Patient is a 85y old  Female who presents with a chief complaint of Abdominal pain (27 Sep 2018 05:25)      INTERVAL HPI/OVERNIGHT EVENTS: no events     MEDICATIONS  (STANDING):  carbidopa/levodopa  25/100 1 Tablet(s) Oral <User Schedule>  dextrose 5%. 1000 milliLiter(s) (50 mL/Hr) IV Continuous <Continuous>  escitalopram 5 milliGRAM(s) Oral daily  heparin  Injectable 5000 Unit(s) SubCutaneous every 12 hours  hydroxychloroquine 200 milliGRAM(s) Oral daily  influenza   Vaccine 0.5 milliLiter(s) IntraMuscular once  mirtazapine 15 milliGRAM(s) Oral at bedtime  pantoprazole  Injectable 40 milliGRAM(s) IV Push daily  piperacillin/tazobactam IVPB. 3.375 Gram(s) IV Intermittent every 8 hours    MEDICATIONS  (PRN):  acetaminophen   Tablet .. 650 milliGRAM(s) Oral every 6 hours PRN Temp greater or equal to 38C (100.4F), Mild Pain (1 - 3)  morphine  - Injectable 2 milliGRAM(s) IV Push every 4 hours PRN Severe Pain (7 - 10)  ondansetron Injectable 4 milliGRAM(s) IV Push every 6 hours PRN Nausea and/or Vomiting  oxyCODONE    5 mG/acetaminophen 325 mG 1 Tablet(s) Oral every 4 hours PRN Moderate Pain (4 - 6)      Allergies    No Known Allergies    Intolerances        REVIEW OF SYSTEMS:  CONSTITUTIONAL: No fever, weight loss, or fatigue  EYES: No eye pain, visual disturbances, or discharge  ENMT:  No difficulty hearing, tinnitus, vertigo; No sinus or throat pain  NECK: No pain or stiffness  BREASTS: No pain, masses, or nipple discharge  RESPIRATORY: No cough, wheezing, chills or hemoptysis; No shortness of breath  CARDIOVASCULAR: No chest pain, palpitations, dizziness, or leg swelling  GASTROINTESTINAL: No abdominal or epigastric pain. No nausea, vomiting, or hematemesis; No diarrhea or constipation. No melena or hematochezia.  GENITOURINARY: No dysuria, frequency, hematuria, or incontinence  NEUROLOGICAL: No headaches, memory loss, loss of strength, numbness, or tremors  SKIN: No itching, burning, rashes, or lesions   LYMPH NODES: No enlarged glands  ENDOCRINE: No heat or cold intolerance; No hair loss  MUSCULOSKELETAL: No joint pain or swelling; No muscle, back, or extremity pain  PSYCHIATRIC: No depression, anxiety, mood swings, or difficulty sleeping  HEME/LYMPH: No easy bruising, or bleeding gums  ALLERGY AND IMMUNOLOGIC: No hives or eczema    Vital Signs Last 24 Hrs  T(C): 36.4 (27 Sep 2018 11:01), Max: 36.8 (26 Sep 2018 15:00)  T(F): 97.6 (27 Sep 2018 11:01), Max: 98.2 (26 Sep 2018 15:00)  HR: 87 (27 Sep 2018 12:45) (82 - 96)  BP: 131/71 (27 Sep 2018 12:45) (126/70 - 141/82)  BP(mean): --  RR: 16 (27 Sep 2018 12:45) (16 - 18)  SpO2: 97% (27 Sep 2018 12:45) (95% - 97%)    PHYSICAL EXAM:  GENERAL: NAD, well-groomed, well-developed  HEAD:  Atraumatic, Normocephalic  EYES: EOMI, PERRLA, conjunctiva and sclera clear  ENMT: No tonsillar erythema, exudates, or enlargement; Moist mucous membranes, Good dentition, No lesions  NECK: Supple, No JVD, Normal thyroid  NERVOUS SYSTEM:  Alert & Oriented X3, Good concentration; Motor Strength 5/5 B/L upper and lower extremities; DTRs 2+ intact and symmetric  CHEST/LUNG: Clear to percussion bilaterally; No rales, rhonchi, wheezing, or rubs  HEART: Regular rate and rhythm; No murmurs, rubs, or gallops  ABDOMEN: Soft, Nontender, Nondistended; Bowel sounds present  EXTREMITIES:  2+ Peripheral Pulses, No clubbing, cyanosis, or edema  LYMPH: No lymphadenopathy noted  SKIN: No rashes or lesions      LABS:                        9.8    7.25  )-----------( 198      ( 27 Sep 2018 06:34 )             30.2     09-27    145  |  107  |  5<L>  ----------------------------<  104<H>  3.2<L>   |  30  |  0.55    Ca    6.5<LL>      27 Sep 2018 06:34    TPro  5.1<L>  /  Alb  1.9<L>  /  TBili  0.3  /  DBili  x   /  AST  20  /  ALT  9<L>  /  AlkPhos  53  09-27        CAPILLARY BLOOD GLUCOSE          RADIOLOGY & ADDITIONAL TESTS:    Imaging Personally Reviewed:  [ X] YES  [ ] NO    Consultant(s) Notes Reviewed:  [ X] YES  [ ] NO    Care Discussed with Consultants/Other Providers [X ] YES  [ ] NO

## 2018-09-27 NOTE — PROGRESS NOTE ADULT - REASON FOR ADMISSION
Abdominal pain

## 2018-09-27 NOTE — PROCEDURE NOTE - ADDITIONAL PROCEDURE DETAILS
Patient seen at bedside for midline catheter placement.  Consent obtained from patient after risks/benefits/alternatives explained.   Upper extremity prepped and draped in usual sterile fashion. Time out performed with RN. 4Fr 20cm single lumen midline catheter placed using ultrasound guided Seldinger technique, L basilic vein. Flushes well, with good venous return. Patient tolerated procedure well without complication and was left in no acute distress. Upper arm circumference noted to be 26cm

## 2018-09-27 NOTE — PROCEDURE NOTE - NSPROCDETAILS_GEN_ALL_CORE
supine position/ultrasound guidance/location identified, draped/prepped, sterile technique used/sterile technique, catheter placed/sterile dressing applied

## 2018-09-28 LAB
CULTURE RESULTS: SIGNIFICANT CHANGE UP
CULTURE RESULTS: SIGNIFICANT CHANGE UP
SPECIMEN SOURCE: SIGNIFICANT CHANGE UP
SPECIMEN SOURCE: SIGNIFICANT CHANGE UP

## 2018-10-05 DIAGNOSIS — A41.9 SEPSIS, UNSPECIFIED ORGANISM: ICD-10-CM

## 2018-10-05 DIAGNOSIS — M06.00 RHEUMATOID ARTHRITIS WITHOUT RHEUMATOID FACTOR, UNSPECIFIED SITE: ICD-10-CM

## 2018-10-05 DIAGNOSIS — E87.6 HYPOKALEMIA: ICD-10-CM

## 2018-10-05 DIAGNOSIS — K57.20 DIVERTICULITIS OF LARGE INTESTINE WITH PERFORATION AND ABSCESS WITHOUT BLEEDING: ICD-10-CM

## 2018-10-05 DIAGNOSIS — D62 ACUTE POSTHEMORRHAGIC ANEMIA: ICD-10-CM

## 2018-10-05 DIAGNOSIS — E87.2 ACIDOSIS: ICD-10-CM

## 2018-10-05 DIAGNOSIS — K63.1 PERFORATION OF INTESTINE (NONTRAUMATIC): ICD-10-CM

## 2018-10-05 DIAGNOSIS — E43 UNSPECIFIED SEVERE PROTEIN-CALORIE MALNUTRITION: ICD-10-CM

## 2018-10-05 DIAGNOSIS — R65.21 SEVERE SEPSIS WITH SEPTIC SHOCK: ICD-10-CM

## 2018-10-05 DIAGNOSIS — E83.39 OTHER DISORDERS OF PHOSPHORUS METABOLISM: ICD-10-CM

## 2018-10-05 DIAGNOSIS — N83.202 UNSPECIFIED OVARIAN CYST, LEFT SIDE: ICD-10-CM

## 2018-10-09 PROBLEM — M06.9 RHEUMATOID ARTHRITIS: Status: ACTIVE | Noted: 2018-10-09

## 2018-10-09 PROBLEM — G20 PARKINSON DISEASE, SYMPTOMATIC: Status: ACTIVE | Noted: 2018-10-09

## 2018-10-09 PROBLEM — F32.9 DEPRESSION: Status: ACTIVE | Noted: 2018-10-09

## 2018-10-09 RX ORDER — HYDROXYCHLOROQUINE SULFATE 200 MG/1
200 TABLET ORAL
Refills: 0 | Status: ACTIVE | COMMUNITY

## 2018-10-09 RX ORDER — CARBIDOPA AND LEVODOPA 25; 100 MG/1; MG/1
25-100 TABLET ORAL
Refills: 0 | Status: ACTIVE | COMMUNITY

## 2018-10-09 RX ORDER — MIRTAZAPINE 15 MG/1
15 TABLET, FILM COATED ORAL
Refills: 0 | Status: ACTIVE | COMMUNITY

## 2018-10-10 ENCOUNTER — APPOINTMENT (OUTPATIENT)
Dept: SURGERY | Facility: CLINIC | Age: 83
End: 2018-10-10
Payer: MEDICARE

## 2018-10-10 VITALS
BODY MASS INDEX: 17.84 KG/M2 | RESPIRATION RATE: 16 BRPM | TEMPERATURE: 97.7 F | HEIGHT: 58 IN | SYSTOLIC BLOOD PRESSURE: 129 MMHG | WEIGHT: 85 LBS | DIASTOLIC BLOOD PRESSURE: 80 MMHG | HEART RATE: 96 BPM | OXYGEN SATURATION: 96 %

## 2018-10-10 DIAGNOSIS — F32.9 MAJOR DEPRESSIVE DISORDER, SINGLE EPISODE, UNSPECIFIED: ICD-10-CM

## 2018-10-10 DIAGNOSIS — K57.20 DIVERTICULITIS OF LARGE INTESTINE WITH PERFORATION AND ABSCESS W/OUT BLEEDING: ICD-10-CM

## 2018-10-10 DIAGNOSIS — G20 PARKINSON'S DISEASE: ICD-10-CM

## 2018-10-10 DIAGNOSIS — Z78.9 OTHER SPECIFIED HEALTH STATUS: ICD-10-CM

## 2018-10-10 DIAGNOSIS — M06.9 RHEUMATOID ARTHRITIS, UNSPECIFIED: ICD-10-CM

## 2018-10-10 PROCEDURE — 99024 POSTOP FOLLOW-UP VISIT: CPT

## 2018-10-10 RX ORDER — ESCITALOPRAM OXALATE 5 MG/1
5 TABLET, FILM COATED ORAL
Refills: 0 | Status: DISCONTINUED | COMMUNITY
End: 2018-10-10

## 2018-10-10 RX ORDER — CITALOPRAM HYDROBROMIDE 10 MG/1
10 TABLET, FILM COATED ORAL
Refills: 0 | Status: ACTIVE | COMMUNITY

## 2018-10-20 PROBLEM — K57.20 DIVERTICULITIS OF LARGE INTESTINE WITH PERFORATION AND ABSCESS WITHOUT BLEEDING: Status: RESOLVED | Noted: 2018-10-20 | Resolved: 2018-10-20

## 2018-12-12 ENCOUNTER — APPOINTMENT (OUTPATIENT)
Dept: SURGERY | Facility: CLINIC | Age: 83
End: 2018-12-12
Payer: MEDICARE

## 2018-12-12 VITALS
RESPIRATION RATE: 17 BRPM | HEIGHT: 58 IN | TEMPERATURE: 97.9 F | SYSTOLIC BLOOD PRESSURE: 123 MMHG | DIASTOLIC BLOOD PRESSURE: 87 MMHG | OXYGEN SATURATION: 99 % | BODY MASS INDEX: 17.84 KG/M2 | HEART RATE: 88 BPM | WEIGHT: 85 LBS

## 2018-12-12 PROCEDURE — 99024 POSTOP FOLLOW-UP VISIT: CPT

## 2018-12-12 RX ORDER — ACETAMINOPHEN 325 MG/1
325 TABLET ORAL
Refills: 0 | Status: DISCONTINUED | COMMUNITY
End: 2018-12-12

## 2018-12-12 RX ORDER — OXYCODONE AND ACETAMINOPHEN 5; 325 MG/1; MG/1
5-325 TABLET ORAL
Refills: 0 | Status: DISCONTINUED | COMMUNITY
End: 2018-12-12

## 2018-12-12 RX ORDER — MIRTAZAPINE 30 MG/1
TABLET, FILM COATED ORAL
Refills: 0 | Status: DISCONTINUED | COMMUNITY
End: 2018-12-12

## 2018-12-12 RX ORDER — PSYLLIUM HUSK 0.4 G
CAPSULE ORAL
Refills: 0 | Status: DISCONTINUED | COMMUNITY
End: 2018-12-12

## 2018-12-12 RX ORDER — PIPERACILLIN SODIUM AND TAZOBACTAM SODIUM 3; .375 G/15ML; G/15ML
3.375 (3-0.375) INJECTION, POWDER, FOR SOLUTION INTRAVENOUS
Refills: 0 | Status: DISCONTINUED | COMMUNITY
End: 2018-12-12

## 2018-12-13 ENCOUNTER — TRANSCRIPTION ENCOUNTER (OUTPATIENT)
Age: 83
End: 2018-12-13

## 2018-12-19 ENCOUNTER — CLINICAL ADVICE (OUTPATIENT)
Age: 83
End: 2018-12-19

## 2018-12-21 ENCOUNTER — OUTPATIENT (OUTPATIENT)
Dept: OUTPATIENT SERVICES | Facility: HOSPITAL | Age: 83
LOS: 1 days | End: 2018-12-21
Payer: MEDICARE

## 2018-12-21 VITALS
SYSTOLIC BLOOD PRESSURE: 146 MMHG | WEIGHT: 91.05 LBS | DIASTOLIC BLOOD PRESSURE: 83 MMHG | HEART RATE: 88 BPM | RESPIRATION RATE: 16 BRPM | OXYGEN SATURATION: 99 % | TEMPERATURE: 98 F | HEIGHT: 59 IN

## 2018-12-21 DIAGNOSIS — Z93.3 COLOSTOMY STATUS: ICD-10-CM

## 2018-12-21 DIAGNOSIS — Z98.890 OTHER SPECIFIED POSTPROCEDURAL STATES: Chronic | ICD-10-CM

## 2018-12-21 DIAGNOSIS — Z01.818 ENCOUNTER FOR OTHER PREPROCEDURAL EXAMINATION: ICD-10-CM

## 2018-12-21 DIAGNOSIS — Z29.9 ENCOUNTER FOR PROPHYLACTIC MEASURES, UNSPECIFIED: ICD-10-CM

## 2018-12-21 DIAGNOSIS — G20 PARKINSON'S DISEASE: ICD-10-CM

## 2018-12-21 DIAGNOSIS — C49.A0 GASTROINTESTINAL STROMAL TUMOR, UNSPECIFIED SITE: Chronic | ICD-10-CM

## 2018-12-21 LAB
ANION GAP SERPL CALC-SCNC: 12 MMOL/L — SIGNIFICANT CHANGE UP (ref 5–17)
BLD GP AB SCN SERPL QL: NEGATIVE — SIGNIFICANT CHANGE UP
BUN SERPL-MCNC: 21 MG/DL — SIGNIFICANT CHANGE UP (ref 7–23)
CALCIUM SERPL-MCNC: 8.9 MG/DL — SIGNIFICANT CHANGE UP (ref 8.4–10.5)
CHLORIDE SERPL-SCNC: 102 MMOL/L — SIGNIFICANT CHANGE UP (ref 96–108)
CO2 SERPL-SCNC: 28 MMOL/L — SIGNIFICANT CHANGE UP (ref 22–31)
CREAT SERPL-MCNC: 0.53 MG/DL — SIGNIFICANT CHANGE UP (ref 0.5–1.3)
GLUCOSE SERPL-MCNC: 83 MG/DL — SIGNIFICANT CHANGE UP (ref 70–99)
HCT VFR BLD CALC: 34.9 % — SIGNIFICANT CHANGE UP (ref 34.5–45)
HGB BLD-MCNC: 10.7 G/DL — LOW (ref 11.5–15.5)
MCHC RBC-ENTMCNC: 29.6 PG — SIGNIFICANT CHANGE UP (ref 27–34)
MCHC RBC-ENTMCNC: 30.7 GM/DL — LOW (ref 32–36)
MCV RBC AUTO: 96.7 FL — SIGNIFICANT CHANGE UP (ref 80–100)
PLATELET # BLD AUTO: 238 K/UL — SIGNIFICANT CHANGE UP (ref 150–400)
POTASSIUM SERPL-MCNC: 4.3 MMOL/L — SIGNIFICANT CHANGE UP (ref 3.5–5.3)
POTASSIUM SERPL-SCNC: 4.3 MMOL/L — SIGNIFICANT CHANGE UP (ref 3.5–5.3)
RBC # BLD: 3.61 M/UL — LOW (ref 3.8–5.2)
RBC # FLD: 14.5 % — SIGNIFICANT CHANGE UP (ref 10.3–14.5)
RH IG SCN BLD-IMP: POSITIVE — SIGNIFICANT CHANGE UP
SODIUM SERPL-SCNC: 142 MMOL/L — SIGNIFICANT CHANGE UP (ref 135–145)
WBC # BLD: 8.54 K/UL — SIGNIFICANT CHANGE UP (ref 3.8–10.5)
WBC # FLD AUTO: 8.54 K/UL — SIGNIFICANT CHANGE UP (ref 3.8–10.5)

## 2018-12-21 PROCEDURE — 85027 COMPLETE CBC AUTOMATED: CPT

## 2018-12-21 PROCEDURE — 86900 BLOOD TYPING SEROLOGIC ABO: CPT

## 2018-12-21 PROCEDURE — 80048 BASIC METABOLIC PNL TOTAL CA: CPT

## 2018-12-21 PROCEDURE — 86850 RBC ANTIBODY SCREEN: CPT

## 2018-12-21 PROCEDURE — G0463: CPT

## 2018-12-21 PROCEDURE — 86901 BLOOD TYPING SEROLOGIC RH(D): CPT

## 2018-12-21 PROCEDURE — 83036 HEMOGLOBIN GLYCOSYLATED A1C: CPT

## 2018-12-21 RX ORDER — HYDROXYCHLOROQUINE SULFATE 200 MG
1 TABLET ORAL
Qty: 0 | Refills: 0 | COMMUNITY

## 2018-12-21 RX ORDER — CARBIDOPA AND LEVODOPA 25; 100 MG/1; MG/1
1 TABLET ORAL
Qty: 0 | Refills: 0 | COMMUNITY

## 2018-12-21 RX ORDER — MIRTAZAPINE 45 MG/1
1 TABLET, ORALLY DISINTEGRATING ORAL
Qty: 0 | Refills: 0 | COMMUNITY

## 2018-12-21 RX ORDER — ESCITALOPRAM OXALATE 10 MG/1
1 TABLET, FILM COATED ORAL
Qty: 0 | Refills: 0 | COMMUNITY

## 2018-12-21 NOTE — H&P PST ADULT - ASSESSMENT
CAPRINI SCORE [CLOT]    AGE RELATED RISK FACTORS                                                       MOBILITY RELATED FACTORS  [ ] Age 41-60 years                                            (1 Point)                  [ ] Bed rest                                                        (1 Point)  [ ] Age: 61-74 years                                           (2 Points)                 [ ] Plaster cast                                                   (2 Points)  [x ] Age= 75 years                                              (3 Points)                 [ ] Bed bound for more than 72 hours                 (2 Points)    DISEASE RELATED RISK FACTORS                                               GENDER SPECIFIC FACTORS  [ x] Edema in the lower extremities                       (1 Point)                  [ ] Pregnancy                                                     (1 Point)  [ ] Varicose veins                                               (1 Point)                  [ ] Post-partum < 6 weeks                                   (1 Point)             [ ] BMI > 25 Kg/m2                                            (1 Point)                  [ ] Hormonal therapy  or oral contraception          (1 Point)                 [ ] Sepsis (in the previous month)                        (1 Point)                  [ ] History of pregnancy complications                 (1 point)  [ ] Pneumonia or serious lung disease                                               [ ] Unexplained or recurrent                     (1 Point)           (in the previous month)                               (1 Point)  [ ] Abnormal pulmonary function test                     (1 Point)                 SURGERY RELATED RISK FACTORS  [ ] Acute myocardial infarction                              (1 Point)                 [ ]  Section                                             (1 Point)  [ ] Congestive heart failure (in the previous month)  (1 Point)               [ ] Minor surgery                                                  (1 Point)   [ ] Inflammatory bowel disease                             (1 Point)                 [ ] Arthroscopic surgery                                        (2 Points)  [ ] Central venous access                                      (2 Points)                [x ] General surgery lasting more than 45 minutes   (2 Points)       [ ] Stroke (in the previous month)                          (5 Points)               [ ] Elective arthroplasty                                         (5 Points)                                                                                                                                               HEMATOLOGY RELATED FACTORS                                                 TRAUMA RELATED RISK FACTORS  [ ] Prior episodes of VTE                                     (3 Points)                 [ ] Fracture of the hip, pelvis, or leg                       (5 Points)  [ ] Positive family history for VTE                         (3 Points)                 [ ] Acute spinal cord injury (in the previous month)  (5 Points)  [ ] Prothrombin 97564 A                                     (3 Points)                 [ ] Paralysis  (less than 1 month)                             (5 Points)  [ ] Factor V Leiden                                             (3 Points)                  [ ] Multiple Trauma within 1 month                        (5 Points)  [ ] Lupus anticoagulants                                     (3 Points)                                                           [ ] Anticardiolipin antibodies                               (3 Points)                                                       [ ] High homocysteine in the blood                      (3 Points)                                             [ ] Other congenital or acquired thrombophilia      (3 Points)                                                [ ] Heparin induced thrombocytopenia                  (3 Points)                                          Total Score [    6      ]

## 2018-12-21 NOTE — H&P PST ADULT - PMH
Colostomy in place    Depression    Diverticulitis  Perforated Diverticulitis s/p Exploratory laparotomy Lebron's resection and abdominal washout on 9/22/18  Parkinson disease    Rheumatoid arthritis  severe fingers deformity)

## 2018-12-21 NOTE — H&P PST ADULT - PSH
Gastrointestinal stromal tumor (GIST)  s/p resection ~14yrs ago  H/O exploratory laparotomy  Lebron's resection and abdominal washout on 9/22/18

## 2018-12-21 NOTE — H&P PST ADULT - RS GEN PE MLT RESP DETAILS PC
breath sounds equal/good air movement/clear to auscultation bilaterally/no rhonchi/no rales/respirations non-labored/no wheezes

## 2018-12-21 NOTE — H&P PST ADULT - GASTROINTESTINAL DETAILS
nontender/no masses palpable/soft/bowel sounds normal/no distention/no rebound tenderness/no rigidity

## 2018-12-21 NOTE — H&P PST ADULT - HISTORY OF PRESENT ILLNESS
85 years old female H/O Rheumatoid arthritis (severe bilateral hands fingers deformity), Parkinson disease, Depression/Anxiety, GIST tumor of stomach (s/p resection ~14yrs ago), Perforated Diverticulitis s/p Exploratory laparotomy Lebron's resection and abdominal washout on 9/22/18. Pt wishes to proceed with colostomy reversal and now scheduled for ERAS Open Colostomy Takedown with Low Pelvic Anastomosis on 1/4/19. Denies any palpitation, SOB, N/V, fever or chills.

## 2018-12-21 NOTE — H&P PST ADULT - MUSCULOSKELETAL
details… detailed exam no calf tenderness/decreased ROM due to pain/joint swelling/diminished strength

## 2018-12-22 PROBLEM — M06.9 RHEUMATOID ARTHRITIS, UNSPECIFIED: Chronic | Status: ACTIVE | Noted: 2018-09-22

## 2018-12-22 PROBLEM — K57.92 DIVERTICULITIS OF INTESTINE, PART UNSPECIFIED, WITHOUT PERFORATION OR ABSCESS WITHOUT BLEEDING: Chronic | Status: ACTIVE | Noted: 2018-12-21

## 2018-12-22 LAB — HBA1C BLD-MCNC: 5.2 % — SIGNIFICANT CHANGE UP (ref 4–5.6)

## 2018-12-30 PROBLEM — Z93.3 COLOSTOMY STATUS: Chronic | Status: ACTIVE | Noted: 2018-12-21

## 2018-12-30 PROBLEM — F32.9 MAJOR DEPRESSIVE DISORDER, SINGLE EPISODE, UNSPECIFIED: Chronic | Status: ACTIVE | Noted: 2018-12-21

## 2018-12-30 PROBLEM — G20 PARKINSON'S DISEASE: Chronic | Status: ACTIVE | Noted: 2018-12-21

## 2019-01-03 ENCOUNTER — INPATIENT (INPATIENT)
Facility: HOSPITAL | Age: 84
LOS: 4 days | Discharge: HOME | DRG: 330 | End: 2019-01-08
Attending: SURGERY | Admitting: SURGERY
Payer: MEDICARE

## 2019-01-03 ENCOUNTER — TRANSCRIPTION ENCOUNTER (OUTPATIENT)
Age: 84
End: 2019-01-03

## 2019-01-03 ENCOUNTER — APPOINTMENT (OUTPATIENT)
Dept: SURGERY | Facility: HOSPITAL | Age: 84
End: 2019-01-03
Payer: MEDICARE

## 2019-01-03 VITALS
RESPIRATION RATE: 18 BRPM | HEIGHT: 59 IN | HEART RATE: 98 BPM | DIASTOLIC BLOOD PRESSURE: 72 MMHG | TEMPERATURE: 99 F | OXYGEN SATURATION: 98 % | WEIGHT: 93.92 LBS | SYSTOLIC BLOOD PRESSURE: 132 MMHG

## 2019-01-03 DIAGNOSIS — C49.A0 GASTROINTESTINAL STROMAL TUMOR, UNSPECIFIED SITE: Chronic | ICD-10-CM

## 2019-01-03 DIAGNOSIS — Z98.890 OTHER SPECIFIED POSTPROCEDURAL STATES: Chronic | ICD-10-CM

## 2019-01-03 DIAGNOSIS — Z93.3 COLOSTOMY STATUS: ICD-10-CM

## 2019-01-03 LAB
ANION GAP SERPL CALC-SCNC: 11 MMOL/L — SIGNIFICANT CHANGE UP (ref 5–17)
APTT BLD: 32.2 SEC — SIGNIFICANT CHANGE UP (ref 27.5–36.3)
BLD GP AB SCN SERPL QL: NEGATIVE — SIGNIFICANT CHANGE UP
BUN SERPL-MCNC: 17 MG/DL — SIGNIFICANT CHANGE UP (ref 7–23)
CALCIUM SERPL-MCNC: 8.2 MG/DL — LOW (ref 8.4–10.5)
CHLORIDE SERPL-SCNC: 103 MMOL/L — SIGNIFICANT CHANGE UP (ref 96–108)
CO2 SERPL-SCNC: 28 MMOL/L — SIGNIFICANT CHANGE UP (ref 22–31)
CREAT SERPL-MCNC: 0.68 MG/DL — SIGNIFICANT CHANGE UP (ref 0.5–1.3)
GLUCOSE SERPL-MCNC: 149 MG/DL — HIGH (ref 70–99)
HCT VFR BLD CALC: 34.5 % — SIGNIFICANT CHANGE UP (ref 34.5–45)
HGB BLD-MCNC: 11.6 G/DL — SIGNIFICANT CHANGE UP (ref 11.5–15.5)
INR BLD: 1.1 RATIO — SIGNIFICANT CHANGE UP (ref 0.88–1.16)
MAGNESIUM SERPL-MCNC: 2.2 MG/DL — SIGNIFICANT CHANGE UP (ref 1.6–2.6)
MCHC RBC-ENTMCNC: 31.9 PG — SIGNIFICANT CHANGE UP (ref 27–34)
MCHC RBC-ENTMCNC: 33.6 GM/DL — SIGNIFICANT CHANGE UP (ref 32–36)
MCV RBC AUTO: 95.1 FL — SIGNIFICANT CHANGE UP (ref 80–100)
PHOSPHATE SERPL-MCNC: 2.5 MG/DL — SIGNIFICANT CHANGE UP (ref 2.5–4.5)
PLATELET # BLD AUTO: 199 K/UL — SIGNIFICANT CHANGE UP (ref 150–400)
POTASSIUM SERPL-MCNC: 3.8 MMOL/L — SIGNIFICANT CHANGE UP (ref 3.5–5.3)
POTASSIUM SERPL-SCNC: 3.8 MMOL/L — SIGNIFICANT CHANGE UP (ref 3.5–5.3)
PROTHROM AB SERPL-ACNC: 12.6 SEC — SIGNIFICANT CHANGE UP (ref 10–12.9)
RBC # BLD: 3.63 M/UL — LOW (ref 3.8–5.2)
RBC # FLD: 13 % — SIGNIFICANT CHANGE UP (ref 10.3–14.5)
RH IG SCN BLD-IMP: POSITIVE — SIGNIFICANT CHANGE UP
SODIUM SERPL-SCNC: 142 MMOL/L — SIGNIFICANT CHANGE UP (ref 135–145)
WBC # BLD: 12.1 K/UL — HIGH (ref 3.8–10.5)
WBC # FLD AUTO: 12.1 K/UL — HIGH (ref 3.8–10.5)

## 2019-01-03 PROCEDURE — 45381 COLONOSCOPY SUBMUCOUS NJX: CPT

## 2019-01-03 PROCEDURE — 76856 US EXAM PELVIC COMPLETE: CPT | Mod: 26

## 2019-01-03 RX ORDER — CIPROFLOXACIN LACTATE 400MG/40ML
250 VIAL (ML) INTRAVENOUS ONCE
Qty: 0 | Refills: 0 | Status: COMPLETED | OUTPATIENT
Start: 2019-01-04 | End: 2019-01-04

## 2019-01-03 RX ORDER — MIRTAZAPINE 45 MG/1
15 TABLET, ORALLY DISINTEGRATING ORAL DAILY
Qty: 0 | Refills: 0 | Status: DISCONTINUED | OUTPATIENT
Start: 2019-01-03 | End: 2019-01-04

## 2019-01-03 RX ORDER — METRONIDAZOLE 500 MG
250 TABLET ORAL ONCE
Qty: 0 | Refills: 0 | Status: COMPLETED | OUTPATIENT
Start: 2019-01-03 | End: 2019-01-03

## 2019-01-03 RX ORDER — ESCITALOPRAM OXALATE 10 MG/1
5 TABLET, FILM COATED ORAL DAILY
Qty: 0 | Refills: 0 | Status: DISCONTINUED | OUTPATIENT
Start: 2019-01-03 | End: 2019-01-04

## 2019-01-03 RX ORDER — GABAPENTIN 400 MG/1
600 CAPSULE ORAL ONCE
Qty: 0 | Refills: 0 | Status: COMPLETED | OUTPATIENT
Start: 2019-01-04 | End: 2019-01-04

## 2019-01-03 RX ORDER — CARBIDOPA AND LEVODOPA 25; 100 MG/1; MG/1
1 TABLET ORAL
Qty: 0 | Refills: 0 | Status: DISCONTINUED | OUTPATIENT
Start: 2019-01-03 | End: 2019-01-04

## 2019-01-03 RX ORDER — METRONIDAZOLE 500 MG
250 TABLET ORAL ONCE
Qty: 0 | Refills: 0 | Status: COMPLETED | OUTPATIENT
Start: 2019-01-04 | End: 2019-01-04

## 2019-01-03 RX ORDER — CIPROFLOXACIN LACTATE 400MG/40ML
250 VIAL (ML) INTRAVENOUS ONCE
Qty: 0 | Refills: 0 | Status: COMPLETED | OUTPATIENT
Start: 2019-01-03 | End: 2019-01-03

## 2019-01-03 RX ORDER — CIPROFLOXACIN LACTATE 400MG/40ML
250 VIAL (ML) INTRAVENOUS DAILY
Qty: 0 | Refills: 0 | Status: DISCONTINUED | OUTPATIENT
Start: 2019-01-03 | End: 2019-01-03

## 2019-01-03 RX ORDER — HEPARIN SODIUM 5000 [USP'U]/ML
5000 INJECTION INTRAVENOUS; SUBCUTANEOUS EVERY 8 HOURS
Qty: 0 | Refills: 0 | Status: DISCONTINUED | OUTPATIENT
Start: 2019-01-03 | End: 2019-01-04

## 2019-01-03 RX ORDER — POLYETHYLENE GLYCOL 3350 17 G/17G
17 POWDER, FOR SOLUTION ORAL
Qty: 0 | Refills: 0 | Status: COMPLETED | OUTPATIENT
Start: 2019-01-03 | End: 2019-01-03

## 2019-01-03 RX ORDER — HYDROXYCHLOROQUINE SULFATE 200 MG
200 TABLET ORAL DAILY
Qty: 0 | Refills: 0 | Status: DISCONTINUED | OUTPATIENT
Start: 2019-01-03 | End: 2019-01-04

## 2019-01-03 RX ORDER — CELECOXIB 200 MG/1
400 CAPSULE ORAL ONCE
Qty: 0 | Refills: 0 | Status: COMPLETED | OUTPATIENT
Start: 2019-01-04 | End: 2019-01-04

## 2019-01-03 RX ADMIN — Medication 250 MILLIGRAM(S): at 19:30

## 2019-01-03 RX ADMIN — POLYETHYLENE GLYCOL 3350 17 GRAM(S): 17 POWDER, FOR SOLUTION ORAL at 11:59

## 2019-01-03 RX ADMIN — HEPARIN SODIUM 5000 UNIT(S): 5000 INJECTION INTRAVENOUS; SUBCUTANEOUS at 22:20

## 2019-01-03 RX ADMIN — POLYETHYLENE GLYCOL 3350 17 GRAM(S): 17 POWDER, FOR SOLUTION ORAL at 13:50

## 2019-01-03 RX ADMIN — CARBIDOPA AND LEVODOPA 1 TABLET(S): 25; 100 TABLET ORAL at 22:20

## 2019-01-03 RX ADMIN — Medication 250 MILLIGRAM(S): at 11:59

## 2019-01-03 RX ADMIN — HEPARIN SODIUM 5000 UNIT(S): 5000 INJECTION INTRAVENOUS; SUBCUTANEOUS at 13:50

## 2019-01-04 ENCOUNTER — RESULT REVIEW (OUTPATIENT)
Age: 84
End: 2019-01-04

## 2019-01-04 ENCOUNTER — APPOINTMENT (OUTPATIENT)
Dept: SURGERY | Facility: HOSPITAL | Age: 84
End: 2019-01-04
Payer: MEDICARE

## 2019-01-04 LAB
ANION GAP SERPL CALC-SCNC: 12 MMOL/L — SIGNIFICANT CHANGE UP (ref 5–17)
ANION GAP SERPL CALC-SCNC: 12 MMOL/L — SIGNIFICANT CHANGE UP (ref 5–17)
APTT BLD: 46.4 SEC — HIGH (ref 27.5–36.3)
APTT BLD: 52.4 SEC — HIGH (ref 27.5–36.3)
BUN SERPL-MCNC: 10 MG/DL — SIGNIFICANT CHANGE UP (ref 7–23)
BUN SERPL-MCNC: 7 MG/DL — SIGNIFICANT CHANGE UP (ref 7–23)
CALCIUM SERPL-MCNC: 6.9 MG/DL — LOW (ref 8.4–10.5)
CALCIUM SERPL-MCNC: 8.6 MG/DL — SIGNIFICANT CHANGE UP (ref 8.4–10.5)
CHLORIDE SERPL-SCNC: 103 MMOL/L — SIGNIFICANT CHANGE UP (ref 96–108)
CHLORIDE SERPL-SCNC: 105 MMOL/L — SIGNIFICANT CHANGE UP (ref 96–108)
CO2 SERPL-SCNC: 21 MMOL/L — LOW (ref 22–31)
CO2 SERPL-SCNC: 27 MMOL/L — SIGNIFICANT CHANGE UP (ref 22–31)
CREAT SERPL-MCNC: 0.43 MG/DL — LOW (ref 0.5–1.3)
CREAT SERPL-MCNC: 0.53 MG/DL — SIGNIFICANT CHANGE UP (ref 0.5–1.3)
GAS PNL BLDA: SIGNIFICANT CHANGE UP
GLUCOSE SERPL-MCNC: 131 MG/DL — HIGH (ref 70–99)
GLUCOSE SERPL-MCNC: 80 MG/DL — SIGNIFICANT CHANGE UP (ref 70–99)
HCT VFR BLD CALC: 33.9 % — LOW (ref 34.5–45)
HCT VFR BLD CALC: 37.1 % — SIGNIFICANT CHANGE UP (ref 34.5–45)
HGB BLD-MCNC: 11.4 G/DL — LOW (ref 11.5–15.5)
HGB BLD-MCNC: 12.2 G/DL — SIGNIFICANT CHANGE UP (ref 11.5–15.5)
INR BLD: 1.14 RATIO — SIGNIFICANT CHANGE UP (ref 0.88–1.16)
INR BLD: 1.18 RATIO — HIGH (ref 0.88–1.16)
MAGNESIUM SERPL-MCNC: 2.2 MG/DL — SIGNIFICANT CHANGE UP (ref 1.6–2.6)
MAGNESIUM SERPL-MCNC: 2.2 MG/DL — SIGNIFICANT CHANGE UP (ref 1.6–2.6)
MCHC RBC-ENTMCNC: 31.3 PG — SIGNIFICANT CHANGE UP (ref 27–34)
MCHC RBC-ENTMCNC: 31.9 PG — SIGNIFICANT CHANGE UP (ref 27–34)
MCHC RBC-ENTMCNC: 32.8 GM/DL — SIGNIFICANT CHANGE UP (ref 32–36)
MCHC RBC-ENTMCNC: 33.5 GM/DL — SIGNIFICANT CHANGE UP (ref 32–36)
MCV RBC AUTO: 95.1 FL — SIGNIFICANT CHANGE UP (ref 80–100)
MCV RBC AUTO: 95.5 FL — SIGNIFICANT CHANGE UP (ref 80–100)
PHOSPHATE SERPL-MCNC: 2.1 MG/DL — LOW (ref 2.5–4.5)
PHOSPHATE SERPL-MCNC: 2.2 MG/DL — LOW (ref 2.5–4.5)
PLATELET # BLD AUTO: 178 K/UL — SIGNIFICANT CHANGE UP (ref 150–400)
PLATELET # BLD AUTO: 202 K/UL — SIGNIFICANT CHANGE UP (ref 150–400)
POTASSIUM SERPL-MCNC: 3.5 MMOL/L — SIGNIFICANT CHANGE UP (ref 3.5–5.3)
POTASSIUM SERPL-MCNC: 3.7 MMOL/L — SIGNIFICANT CHANGE UP (ref 3.5–5.3)
POTASSIUM SERPL-SCNC: 3.5 MMOL/L — SIGNIFICANT CHANGE UP (ref 3.5–5.3)
POTASSIUM SERPL-SCNC: 3.7 MMOL/L — SIGNIFICANT CHANGE UP (ref 3.5–5.3)
PROTHROM AB SERPL-ACNC: 13.1 SEC — HIGH (ref 10–12.9)
PROTHROM AB SERPL-ACNC: 13.6 SEC — HIGH (ref 10–12.9)
RBC # BLD: 3.56 M/UL — LOW (ref 3.8–5.2)
RBC # BLD: 3.89 M/UL — SIGNIFICANT CHANGE UP (ref 3.8–5.2)
RBC # FLD: 12.9 % — SIGNIFICANT CHANGE UP (ref 10.3–14.5)
RBC # FLD: 13 % — SIGNIFICANT CHANGE UP (ref 10.3–14.5)
SODIUM SERPL-SCNC: 138 MMOL/L — SIGNIFICANT CHANGE UP (ref 135–145)
SODIUM SERPL-SCNC: 142 MMOL/L — SIGNIFICANT CHANGE UP (ref 135–145)
WBC # BLD: 7 K/UL — SIGNIFICANT CHANGE UP (ref 3.8–10.5)
WBC # BLD: 8 K/UL — SIGNIFICANT CHANGE UP (ref 3.8–10.5)
WBC # FLD AUTO: 7 K/UL — SIGNIFICANT CHANGE UP (ref 3.8–10.5)
WBC # FLD AUTO: 8 K/UL — SIGNIFICANT CHANGE UP (ref 3.8–10.5)

## 2019-01-04 PROCEDURE — 88331 PATH CONSLTJ SURG 1 BLK 1SPC: CPT | Mod: 26

## 2019-01-04 PROCEDURE — 44626 REPAIR BOWEL OPENING: CPT

## 2019-01-04 PROCEDURE — 58120 DILATION AND CURETTAGE: CPT

## 2019-01-04 PROCEDURE — 58720 REMOVAL OF OVARY/TUBE(S): CPT

## 2019-01-04 PROCEDURE — 88304 TISSUE EXAM BY PATHOLOGIST: CPT | Mod: 26

## 2019-01-04 PROCEDURE — 88302 TISSUE EXAM BY PATHOLOGIST: CPT | Mod: 26

## 2019-01-04 PROCEDURE — 99231 SBSQ HOSP IP/OBS SF/LOW 25: CPT | Mod: 57,24

## 2019-01-04 PROCEDURE — 88305 TISSUE EXAM BY PATHOLOGIST: CPT | Mod: 26

## 2019-01-04 PROCEDURE — 88307 TISSUE EXAM BY PATHOLOGIST: CPT | Mod: 26

## 2019-01-04 RX ORDER — SODIUM CHLORIDE 9 MG/ML
250 INJECTION, SOLUTION INTRAVENOUS ONCE
Qty: 0 | Refills: 0 | Status: COMPLETED | OUTPATIENT
Start: 2019-01-04 | End: 2019-01-05

## 2019-01-04 RX ORDER — ACETAMINOPHEN 500 MG
600 TABLET ORAL ONCE
Qty: 0 | Refills: 0 | Status: COMPLETED | OUTPATIENT
Start: 2019-01-05 | End: 2019-01-05

## 2019-01-04 RX ORDER — POTASSIUM PHOSPHATE, MONOBASIC POTASSIUM PHOSPHATE, DIBASIC 236; 224 MG/ML; MG/ML
15 INJECTION, SOLUTION INTRAVENOUS ONCE
Qty: 0 | Refills: 0 | Status: COMPLETED | OUTPATIENT
Start: 2019-01-04 | End: 2019-01-04

## 2019-01-04 RX ORDER — CARBIDOPA AND LEVODOPA 25; 100 MG/1; MG/1
1 TABLET ORAL
Qty: 0 | Refills: 0 | Status: DISCONTINUED | OUTPATIENT
Start: 2019-01-04 | End: 2019-01-08

## 2019-01-04 RX ORDER — POTASSIUM PHOSPHATE, MONOBASIC POTASSIUM PHOSPHATE, DIBASIC 236; 224 MG/ML; MG/ML
15 INJECTION, SOLUTION INTRAVENOUS ONCE
Qty: 0 | Refills: 0 | Status: DISCONTINUED | OUTPATIENT
Start: 2019-01-04 | End: 2019-01-04

## 2019-01-04 RX ORDER — FENTANYL CITRATE 50 UG/ML
25 INJECTION INTRAVENOUS
Qty: 0 | Refills: 0 | Status: DISCONTINUED | OUTPATIENT
Start: 2019-01-04 | End: 2019-01-04

## 2019-01-04 RX ORDER — ACETAMINOPHEN 500 MG
600 TABLET ORAL ONCE
Qty: 0 | Refills: 0 | Status: COMPLETED | OUTPATIENT
Start: 2019-01-04 | End: 2019-01-04

## 2019-01-04 RX ORDER — HEPARIN SODIUM 5000 [USP'U]/ML
5000 INJECTION INTRAVENOUS; SUBCUTANEOUS EVERY 8 HOURS
Qty: 0 | Refills: 0 | Status: DISCONTINUED | OUTPATIENT
Start: 2019-01-04 | End: 2019-01-06

## 2019-01-04 RX ORDER — MIRTAZAPINE 45 MG/1
15 TABLET, ORALLY DISINTEGRATING ORAL DAILY
Qty: 0 | Refills: 0 | Status: DISCONTINUED | OUTPATIENT
Start: 2019-01-04 | End: 2019-01-08

## 2019-01-04 RX ORDER — ESCITALOPRAM OXALATE 10 MG/1
5 TABLET, FILM COATED ORAL DAILY
Qty: 0 | Refills: 0 | Status: DISCONTINUED | OUTPATIENT
Start: 2019-01-04 | End: 2019-01-08

## 2019-01-04 RX ORDER — SODIUM CHLORIDE 9 MG/ML
1000 INJECTION, SOLUTION INTRAVENOUS
Qty: 0 | Refills: 0 | Status: DISCONTINUED | OUTPATIENT
Start: 2019-01-04 | End: 2019-01-06

## 2019-01-04 RX ORDER — PHENYLEPHRINE HYDROCHLORIDE 10 MG/ML
0.63 INJECTION INTRAVENOUS
Qty: 40 | Refills: 0 | Status: DISCONTINUED | OUTPATIENT
Start: 2019-01-04 | End: 2019-01-05

## 2019-01-04 RX ADMIN — ESCITALOPRAM OXALATE 5 MILLIGRAM(S): 10 TABLET, FILM COATED ORAL at 11:02

## 2019-01-04 RX ADMIN — Medication 240 MILLIGRAM(S): at 22:06

## 2019-01-04 RX ADMIN — CARBIDOPA AND LEVODOPA 1 TABLET(S): 25; 100 TABLET ORAL at 11:02

## 2019-01-04 RX ADMIN — Medication 200 MILLIGRAM(S): at 11:02

## 2019-01-04 RX ADMIN — HEPARIN SODIUM 5000 UNIT(S): 5000 INJECTION INTRAVENOUS; SUBCUTANEOUS at 22:05

## 2019-01-04 RX ADMIN — CELECOXIB 400 MILLIGRAM(S): 200 CAPSULE ORAL at 11:01

## 2019-01-04 RX ADMIN — SODIUM CHLORIDE 100 MILLILITER(S): 9 INJECTION, SOLUTION INTRAVENOUS at 21:43

## 2019-01-04 RX ADMIN — GABAPENTIN 600 MILLIGRAM(S): 400 CAPSULE ORAL at 11:02

## 2019-01-04 RX ADMIN — Medication 600 MILLIGRAM(S): at 23:00

## 2019-01-04 RX ADMIN — Medication 250 MILLIGRAM(S): at 03:36

## 2019-01-04 RX ADMIN — PHENYLEPHRINE HYDROCHLORIDE 10 MICROGRAM(S)/KG/MIN: 10 INJECTION INTRAVENOUS at 23:54

## 2019-01-04 RX ADMIN — Medication 250 MILLIGRAM(S): at 03:37

## 2019-01-04 RX ADMIN — CARBIDOPA AND LEVODOPA 1 TABLET(S): 25; 100 TABLET ORAL at 23:39

## 2019-01-04 RX ADMIN — HEPARIN SODIUM 5000 UNIT(S): 5000 INJECTION INTRAVENOUS; SUBCUTANEOUS at 05:03

## 2019-01-04 RX ADMIN — POTASSIUM PHOSPHATE, MONOBASIC POTASSIUM PHOSPHATE, DIBASIC 62.5 MILLIMOLE(S): 236; 224 INJECTION, SOLUTION INTRAVENOUS at 23:03

## 2019-01-04 NOTE — BRIEF OPERATIVE NOTE - PRE-OP DX
Colostomy status  01/04/2019    Active  Unruly Hallman  Cyst of left ovary  01/04/2019    Active  Jefferson Gaitan

## 2019-01-04 NOTE — BRIEF OPERATIVE NOTE - POST-OP DX
Colostomy status  01/04/2019    Active  Unruly Hallman  Left ovarian cyst  01/04/2019    Active  Jefferson Gaitan

## 2019-01-04 NOTE — CONSULT NOTE ADULT - SUBJECTIVE AND OBJECTIVE BOX
CONSULT RESULT - SURGICAL INTENSIVE CARE UNIT    HPI:  84 yo woman with a hx of RA, Parkinson disease, gastric GIST (s/p resection ~2005), perforated diverticulitis s/p ex-lap and Lebron resection in September 2018, s/p elective open colostomy takedown with low pelvic anastomosis. Intraoperatively, the patient was hemodynamically stable.     PAST MEDICAL HISTORY:  Colostomy in place  Parkinson disease  Depression  Diverticulitis  Rheumatoid arthritis  No pertinent past medical history      PAST SURGICAL HISTORY:  H/O exploratory laparotomy  Gastrointestinal stromal tumor (GIST)  No significant past surgical history      MEDICATIONS:  acetaminophen  IVPB .. 600 milliGRAM(s) IV Intermittent once  carbidopa/levodopa  25/100 1 Tablet(s) Oral <User Schedule>  escitalopram 5 milliGRAM(s) Oral daily  fentaNYL    Injectable 25 MICROGram(s) IV Push every 5 minutes PRN  heparin  Injectable 5000 Unit(s) SubCutaneous every 8 hours  lactated ringers. 1000 milliLiter(s) IV Continuous <Continuous>  mirtazapine 15 milliGRAM(s) Oral daily  phenylephrine    Infusion 0.626 MICROgram(s)/kG/Min IV Continuous <Continuous>      ALLERGIES:  No Known Allergies      VITALS & I/Os:  Vital Signs Last 24 Hrs  T(C): 36.1 (04 Jan 2019 20:15), Max: 36.9 (04 Jan 2019 02:00)  T(F): 97 (04 Jan 2019 20:15), Max: 98.5 (04 Jan 2019 02:00)  HR: 86 (04 Jan 2019 20:30) (80 - 99)  BP: 105/57 (04 Jan 2019 20:30) (84/54 - 139/63)  BP(mean): 78 (04 Jan 2019 20:30) (78 - 89)  RR: 16 (04 Jan 2019 20:30) (16 - 18)  SpO2: 99% (04 Jan 2019 20:30) (95% - 100%)    I&O's Summary    03 Jan 2019 07:01  -  04 Jan 2019 07:00  --------------------------------------------------------  IN: 940 mL / OUT: 1650 mL / NET: -710 mL    04 Jan 2019 07:01  -  04 Jan 2019 20:36  --------------------------------------------------------  IN: 340 mL / OUT: 250 mL / NET: 90 mL        PHYSICAL EXAM:  [pending examination]    LABS:                        12.2   8.0   )-----------( 178      ( 04 Jan 2019 07:09 )             37.1     01-04    142  |  103  |  10  ----------------------------<  80  3.7   |  27  |  0.53    Ca    8.6      04 Jan 2019 07:09  Phos  2.2     01-04  Mg     2.2     01-04      Lactate:    PT/INR - ( 04 Jan 2019 09:59 )   PT: 13.6 sec;   INR: 1.18 ratio         PTT - ( 04 Jan 2019 09:59 )  PTT:46.4 sec      IMAGING:  no new imaging CONSULT RESULT - SURGICAL INTENSIVE CARE UNIT    HPI:  84 yo woman with a hx of RA, Parkinson disease, gastric GIST (s/p resection ~2005), perforated diverticulitis s/p ex-lap and Lebron resection in September 2018, s/p elective open colostomy takedown with low pelvic anastomosis. Intraoperatively, the patient was intermittently on neosynephrine. EBL was 250, . Total crystalloid given not recorded.    When the patient arrived in PACU, she was hypotensive to the 80s -- she was given a push of neosynephrine, and started on a 500 cc crystalloid bolus. Upon SICU evaluation, her vitals were stable. /81, HR 83, saturation 100% on room air. She denied any pain.    PAST MEDICAL HISTORY:  Colostomy in place  Parkinson disease  Depression  Diverticulitis  Rheumatoid arthritis  No pertinent past medical history      PAST SURGICAL HISTORY:  H/O exploratory laparotomy  Gastrointestinal stromal tumor (GIST)  No significant past surgical history      MEDICATIONS:  acetaminophen  IVPB .. 600 milliGRAM(s) IV Intermittent once  carbidopa/levodopa  25/100 1 Tablet(s) Oral <User Schedule>  escitalopram 5 milliGRAM(s) Oral daily  fentaNYL    Injectable 25 MICROGram(s) IV Push every 5 minutes PRN  heparin  Injectable 5000 Unit(s) SubCutaneous every 8 hours  lactated ringers. 1000 milliLiter(s) IV Continuous <Continuous>  mirtazapine 15 milliGRAM(s) Oral daily  phenylephrine    Infusion 0.626 MICROgram(s)/kG/Min IV Continuous <Continuous>    HOME MEDICATIONS  Home Medications:  Lexapro 5 mg oral tablet: 1 tab(s) orally once a day (21 Dec 2018 11:12)  mirtazapine 15 mg oral tablet: 1 tab(s) orally once a day (at bedtime) (21 Dec 2018 11:12)  Plaquenil 200 mg oral tablet: 1 tab(s) orally once a day (21 Dec 2018 11:12)  Sinemet 25 mg-100 mg oral tablet: 1 tab(s) orally 2 times a day (21 Dec 2018 11:12)      ALLERGIES:  No Known Allergies      VITALS & I/Os:  Vital Signs Last 24 Hrs  T(C): 36.1 (04 Jan 2019 20:15), Max: 36.9 (04 Jan 2019 02:00)  T(F): 97 (04 Jan 2019 20:15), Max: 98.5 (04 Jan 2019 02:00)  HR: 86 (04 Jan 2019 20:30) (80 - 99)  BP: 105/57 (04 Jan 2019 20:30) (84/54 - 139/63)  BP(mean): 78 (04 Jan 2019 20:30) (78 - 89)  RR: 16 (04 Jan 2019 20:30) (16 - 18)  SpO2: 99% (04 Jan 2019 20:30) (95% - 100%)    I&O's Summary    03 Jan 2019 07:01  -  04 Jan 2019 07:00  --------------------------------------------------------  IN: 940 mL / OUT: 1650 mL / NET: -710 mL    04 Jan 2019 07:01  -  04 Jan 2019 20:36  --------------------------------------------------------  IN: 340 mL / OUT: 250 mL / NET: 90 mL        PHYSICAL EXAM:  GEN: NAD, resting quietly  PULM: symmetric chest rise bilaterally, no increased WOB  CV: regular rate, peripheral pulses intact  ABD: soft, ND, midline laparotomy dressing c/d/i  EXTR: no cyanosis or edema    LABS:                        12.2   8.0   )-----------( 178      ( 04 Jan 2019 07:09 )             37.1     01-04    142  |  103  |  10  ----------------------------<  80  3.7   |  27  |  0.53    Ca    8.6      04 Jan 2019 07:09  Phos  2.2     01-04  Mg     2.2     01-04      Lactate:    PT/INR - ( 04 Jan 2019 09:59 )   PT: 13.6 sec;   INR: 1.18 ratio         PTT - ( 04 Jan 2019 09:59 )  PTT:46.4 sec      IMAGING:  no new imaging CONSULT RESULT - SURGICAL INTENSIVE CARE UNIT    HPI:  86 yo woman with a hx of RA, Parkinson disease, gastric GIST (s/p resection ~2005), perforated diverticulitis s/p ex-lap and Lebron resection in September 2018, s/p elective open colostomy takedown with low pelvic anastomosis. Intraoperatively, the patient was intermittently on neosynephrine. EBL was 100, UOP 2500. Total crystalloid given not recorded.    When the patient arrived in PACU, she was hypotensive to the 80s -- she was given a push of neosynephrine, and started on a 500 cc crystalloid bolus. Upon SICU evaluation, her vitals were stable. /81, HR 83, saturation 100% on room air. She denied any pain.    PAST MEDICAL HISTORY:  Colostomy in place  Parkinson disease  Depression  Diverticulitis  Rheumatoid arthritis  No pertinent past medical history      PAST SURGICAL HISTORY:  H/O exploratory laparotomy  Gastrointestinal stromal tumor (GIST)  No significant past surgical history      MEDICATIONS:  acetaminophen  IVPB .. 600 milliGRAM(s) IV Intermittent once  carbidopa/levodopa  25/100 1 Tablet(s) Oral <User Schedule>  escitalopram 5 milliGRAM(s) Oral daily  fentaNYL    Injectable 25 MICROGram(s) IV Push every 5 minutes PRN  heparin  Injectable 5000 Unit(s) SubCutaneous every 8 hours  lactated ringers. 1000 milliLiter(s) IV Continuous <Continuous>  mirtazapine 15 milliGRAM(s) Oral daily  phenylephrine    Infusion 0.626 MICROgram(s)/kG/Min IV Continuous <Continuous>    HOME MEDICATIONS  Home Medications:  Lexapro 5 mg oral tablet: 1 tab(s) orally once a day (21 Dec 2018 11:12)  mirtazapine 15 mg oral tablet: 1 tab(s) orally once a day (at bedtime) (21 Dec 2018 11:12)  Plaquenil 200 mg oral tablet: 1 tab(s) orally once a day (21 Dec 2018 11:12)  Sinemet 25 mg-100 mg oral tablet: 1 tab(s) orally 2 times a day (21 Dec 2018 11:12)      ALLERGIES:  No Known Allergies      VITALS & I/Os:  Vital Signs Last 24 Hrs  T(C): 36.1 (04 Jan 2019 20:15), Max: 36.9 (04 Jan 2019 02:00)  T(F): 97 (04 Jan 2019 20:15), Max: 98.5 (04 Jan 2019 02:00)  HR: 86 (04 Jan 2019 20:30) (80 - 99)  BP: 105/57 (04 Jan 2019 20:30) (84/54 - 139/63)  BP(mean): 78 (04 Jan 2019 20:30) (78 - 89)  RR: 16 (04 Jan 2019 20:30) (16 - 18)  SpO2: 99% (04 Jan 2019 20:30) (95% - 100%)    I&O's Summary    03 Jan 2019 07:01  -  04 Jan 2019 07:00  --------------------------------------------------------  IN: 940 mL / OUT: 1650 mL / NET: -710 mL    04 Jan 2019 07:01  -  04 Jan 2019 20:36  --------------------------------------------------------  IN: 340 mL / OUT: 250 mL / NET: 90 mL        PHYSICAL EXAM:  GEN: NAD, resting quietly  PULM: symmetric chest rise bilaterally, no increased WOB  CV: regular rate, peripheral pulses intact  ABD: soft, ND, midline laparotomy dressing c/d/i  EXTR: no cyanosis or edema    LABS:                        12.2   8.0   )-----------( 178      ( 04 Jan 2019 07:09 )             37.1     01-04    142  |  103  |  10  ----------------------------<  80  3.7   |  27  |  0.53    Ca    8.6      04 Jan 2019 07:09  Phos  2.2     01-04  Mg     2.2     01-04      Lactate:    PT/INR - ( 04 Jan 2019 09:59 )   PT: 13.6 sec;   INR: 1.18 ratio         PTT - ( 04 Jan 2019 09:59 )  PTT:46.4 sec      IMAGING:  no new imaging CONSULT RESULT - SURGICAL INTENSIVE CARE UNIT    HPI:  86 yo woman with a hx of RA, Parkinson disease, gastric GIST (s/p resection ~2005), perforated diverticulitis s/p ex-lap and Lebron resection in September 2018, s/p elective open colostomy takedown with low pelvic anastomosis. Additionally, the patient had a left ovarian cyst s/p D&C and BSO. Intraoperatively, the patient was intermittently on neosynephrine. EBL was 100, UOP 2500. Total crystalloid given not recorded.    When the patient arrived in PACU, she was hypotensive to the 80s -- she was given a push of neosynephrine, and started on a 500 cc crystalloid bolus. Upon SICU evaluation, her vitals were stable. /81, HR 83, saturation 100% on room air. She denied any pain.    PAST MEDICAL HISTORY:  Colostomy in place  Parkinson disease  Depression  Diverticulitis  Rheumatoid arthritis  No pertinent past medical history      PAST SURGICAL HISTORY:  H/O exploratory laparotomy  Gastrointestinal stromal tumor (GIST)  No significant past surgical history      MEDICATIONS:  acetaminophen  IVPB .. 600 milliGRAM(s) IV Intermittent once  carbidopa/levodopa  25/100 1 Tablet(s) Oral <User Schedule>  escitalopram 5 milliGRAM(s) Oral daily  fentaNYL    Injectable 25 MICROGram(s) IV Push every 5 minutes PRN  heparin  Injectable 5000 Unit(s) SubCutaneous every 8 hours  lactated ringers. 1000 milliLiter(s) IV Continuous <Continuous>  mirtazapine 15 milliGRAM(s) Oral daily  phenylephrine    Infusion 0.626 MICROgram(s)/kG/Min IV Continuous <Continuous>    HOME MEDICATIONS  Home Medications:  Lexapro 5 mg oral tablet: 1 tab(s) orally once a day (21 Dec 2018 11:12)  mirtazapine 15 mg oral tablet: 1 tab(s) orally once a day (at bedtime) (21 Dec 2018 11:12)  Plaquenil 200 mg oral tablet: 1 tab(s) orally once a day (21 Dec 2018 11:12)  Sinemet 25 mg-100 mg oral tablet: 1 tab(s) orally 2 times a day (21 Dec 2018 11:12)      ALLERGIES:  No Known Allergies      VITALS & I/Os:  Vital Signs Last 24 Hrs  T(C): 36.1 (04 Jan 2019 20:15), Max: 36.9 (04 Jan 2019 02:00)  T(F): 97 (04 Jan 2019 20:15), Max: 98.5 (04 Jan 2019 02:00)  HR: 86 (04 Jan 2019 20:30) (80 - 99)  BP: 105/57 (04 Jan 2019 20:30) (84/54 - 139/63)  BP(mean): 78 (04 Jan 2019 20:30) (78 - 89)  RR: 16 (04 Jan 2019 20:30) (16 - 18)  SpO2: 99% (04 Jan 2019 20:30) (95% - 100%)    I&O's Summary    03 Jan 2019 07:01  -  04 Jan 2019 07:00  --------------------------------------------------------  IN: 940 mL / OUT: 1650 mL / NET: -710 mL    04 Jan 2019 07:01  -  04 Jan 2019 20:36  --------------------------------------------------------  IN: 340 mL / OUT: 250 mL / NET: 90 mL        PHYSICAL EXAM:  GEN: NAD, resting quietly  PULM: symmetric chest rise bilaterally, no increased WOB  CV: regular rate, peripheral pulses intact  ABD: soft, ND, midline laparotomy dressing c/d/i  EXTR: no cyanosis or edema    LABS:                        12.2   8.0   )-----------( 178      ( 04 Jan 2019 07:09 )             37.1     01-04    142  |  103  |  10  ----------------------------<  80  3.7   |  27  |  0.53    Ca    8.6      04 Jan 2019 07:09  Phos  2.2     01-04  Mg     2.2     01-04      Lactate:    PT/INR - ( 04 Jan 2019 09:59 )   PT: 13.6 sec;   INR: 1.18 ratio         PTT - ( 04 Jan 2019 09:59 )  PTT:46.4 sec      IMAGING:  no new imaging

## 2019-01-04 NOTE — CONSULT NOTE ADULT - ASSESSMENT
86 yo woman with a hx of RA, Parkinson disease, gastric GIST (s/p resection ~2005), perforated diverticulitis s/p ex-lap and Lebron resection in September 2018, s/p elective open colostomy takedown with low pelvic anastomosis.    Home Medications:  Lexapro 5 mg oral tablet: 1 tab(s) orally once a day (21 Dec 2018 11:12)  mirtazapine 15 mg oral tablet: 1 tab(s) orally once a day (at bedtime) (21 Dec 2018 11:12)  Plaquenil 200 mg oral tablet: 1 tab(s) orally once a day (21 Dec 2018 11:12)  Sinemet 25 mg-100 mg oral tablet: 1 tab(s) orally 2 times a day (21 Dec 2018 11:12)      NEURO: acute post-operative pain control. Hx depression/anxiety.  -restart home lexapro, mirtazapine, plaquinel, sinemet    PULM:    CV:    GI:    :    HEME:    ID:    ENDO: 84 yo woman with a hx of RA, Parkinson disease, gastric GIST (s/p resection ~2005), perforated diverticulitis s/p ex-lap and Lebron resection in September 2018, s/p elective open colostomy takedown with low pelvic anastomosis. Hypotensive in PACU, resolved with a push of neosynephrine and 500 cc bolus.    NEURO: acute post-operative pain control. Hx Parkinson's, depression/anxiety.  -restart home lexapro, mirtazapine, plaquinel, sinemet  -IV tylenol, fentanyl PRN    PULM: saturating well on room air.    CV: hypotensive in PACU, resolved.  -cont 500 cc bolus  -LR @ 100 cc/hr  -will re-assess for possible continued resuscitation  -close hemodynamic monitoring    GI: s/p colostomy reversal.  -NPO except meds    : BUN/Cr stable, producing adequate urine.  -strict I/O  -daily labs in am  -cont spontaneous void    HEME: H/H stable, no indication for transfusion.  -repeat CBC in am    ID: afebrile, no leukocytosis, no infectious concerns at this time.    ENDO: no acute issues.    DISPO: SICU for close hemodynamic monitoring 86 yo woman with a hx of RA, Parkinson disease, gastric GIST (s/p resection ~2005), perforated diverticulitis s/p ex-lap and Lebron resection in September 2018, s/p elective open colostomy takedown with low pelvic anastomosis, BSO, and D&C of left ovarian cyst. Hypotensive in PACU, resolved with a push of neosynephrine and 500 cc bolus.    NEURO: acute post-operative pain control. Hx Parkinson's, depression/anxiety.  -restart home lexapro, mirtazapine, plaquinel, sinemet  -IV tylenol, fentanyl PRN    PULM: saturating well on room air.    CV: hypotensive in PACU, resolved.  -cont 500 cc bolus  -LR @ 100 cc/hr  -will re-assess for possible continued resuscitation  -close hemodynamic monitoring    GI: s/p colostomy reversal.  -NPO except meds    : BUN/Cr stable, producing adequate urine.  -strict I/O  -daily labs in am  -cont spontaneous void    HEME: H/H stable, no indication for transfusion.  -repeat CBC in am    ID: afebrile, no leukocytosis, no infectious concerns at this time.    ENDO: no acute issues.    DISPO: SICU for close hemodynamic monitoring 86 yo woman with a hx of RA, Parkinson disease, gastric GIST (s/p resection ~2005), perforated diverticulitis s/p ex-lap and Lebron resection in September 2018, s/p elective open colostomy takedown with low pelvic anastomosis, BSO, and D&C of left ovarian cyst. Hypotensive in PACU, resolved with a push of neosynephrine and 500 cc bolus.    -continue to monitor in PACU overnight  -no indication for SICU admission at this time    NEURO: acute post-operative pain control. Hx Parkinson's, depression/anxiety.  -restart home lexapro, mirtazapine, plaquinel, sinemet  -IV tylenol, fentanyl PRN    PULM: saturating well on room air.    CV: hypotensive in PACU, resolved.  -cont 500 cc bolus  -LR @ 100 cc/hr    GI: s/p colostomy reversal.  -NPO except meds    : BUN/Cr stable, producing adequate urine.  -strict I/O  -daily labs in am  -cont spontaneous void    HEME: H/H stable, no indication for transfusion.    ID: afebrile, no leukocytosis, no infectious concerns at this time.    ENDO: no acute issues.    DISPO: PACU

## 2019-01-04 NOTE — CONSULT NOTE ADULT - ASSESSMENT
84 yo , postmenopausal female w/ incidentally found left ovarian cyst who presents for scheduled open colostomy takedown w/ low pelvic anastomosis.    -Abdominal ultrasound revealed a 4 cm simple left ovarian cyst and 3mm of fluid within the endometrial cavity. Given that patient is post-menopausal will perform a D&C and BSO at the time of colostomy reversal. Discussed with patient potential risk of malignancy. Will send ovaries and endometrial curettings to pathology. Will also obtain a CA-125. GYN oncology aware of case.    -Plan D/W Dr. Sundeep Gaitan, PGY4

## 2019-01-04 NOTE — PROGRESS NOTE ADULT - ASSESSMENT
86 y/o F with hx rheumatoid arthritis (severe b/l hands fingers deformity), Parkinson's disease, depression/anxiety, GIST tumor stomach (s/p resection ~14 yrs ago), perforated diverticulitis s/p ex-lap Lebron's resection and abdominal washout on 9/22/18, admitted for pre-op workup prior to scheduled Lebron's reversal on 1/4.    - Consented for ERP open colostomy takedown with low pelvic anastomosis.  - Found to have left adnexal cyst - Gyn to do b/l salpingoophorectomy in same OR case as Lebron's reversal. Gyn to see pt & consent for their part of surgery.  - F/u AM labs.  - DVT ppx: SQH  - C/w home meds.    - ERP pre-op:  >can have CLD until 10:30am on 1/4, should drink ensure clear at 9:30am.   >Cipro/flagyl @ 12:30pm/20:30 on 12/3 and 3:30am on 1/4.   >Celebrex and gabapentin on call to OR (give at 11am on 1/4).   >NPO starting 10:30am on 1/4. 86 y/o F with hx of rheumatoid arthritis (severe b/l hands/fingers deformity), Parkinson's disease, depression/anxiety, GIST (s/p resection ~14 yrs ago), and perforated diverticulitis s/p ex-lap with Lebron's resection and abdominal washout on 9/22/18, admitted for pre-op workup prior to scheduled Lebron's reversal on 1/4.    - Consented for ERP open colostomy takedown with low pelvic anastomosis.  - Found to have left adnexal cyst - Gyn to do b/l salpingoophorectomy in same OR case as Lebron's reversal. Gyn to see pt & consent for their part of surgery.  - F/u AM labs.  - DVT ppx: SQH  - C/w home meds.    - ERP pre-op:  >can have CLD until 10:30am on 1/4, should drink ensure clear at 9:30am.   >Cipro/flagyl @ 12:30pm/20:30 on 1/3 and 3:30am on 1/4.   >Celebrex and gabapentin on call to OR (give at 11am on 1/4).   >NPO starting 10:30am on 1/4. 84 y/o F with hx of rheumatoid arthritis (severe b/l hands/fingers deformity), Parkinson's disease, depression/anxiety, GIST (s/p resection ~14 yrs ago), and perforated diverticulitis s/p ex-lap with Lebron's resection and abdominal washout on 9/22/18, admitted after 1/3/19 colonoscopy for pre-op optimization prior to scheduled Lebron's reversal on 1/4.    - Consented for ERP open colostomy takedown with low pelvic anastomosis.  - Found to have left adnexal cyst - Gyn to do b/l salpingo-oophorectomy while pt is in OR for Lebron's reversal. Gyn to see pt & consent for their part of surgery this AM.  - F/u AM labs.  - DVT ppx: SQH  - C/w home meds.    - ERP pre-op:  >can have CLD until 10:30am on 1/4, should drink ensure clear at 9:30am.   >Cipro/flagyl @ 12:30pm/20:30 on 1/3 and 3:30am on 1/4.   >Celebrex and gabapentin on call to OR (give at 11am on 1/4).   >NPO starting 10:30am on 1/4.

## 2019-01-04 NOTE — CONSULT NOTE ADULT - SUBJECTIVE AND OBJECTIVE BOX
R[facility] GYN Consult Note:     [place]:   86 yo , postmenopausal female who presents for scheduled  open colostomy takedown w/ low pelvic anastomosis .Pt incidentally found to have a 4 cm left ovarian cyst on routine CT A/P ordered by surgery. Per patient she has no known history of ovarian cysts.  Pt states that she was last seen by a gynecologist two years ago and was told that she no longer needed pap smears. Pt denies any significant gynecologic history. Pt denies abdominal pain, distention, early satiety, bloating, weight loss, constipation, pelvic pain, or post-menopausal bleeding.    OBHx: NSVDX4  GYNHx: Denies cysts, fibroids, abnl pap smears, post menopausal  PMSHx: Perforated Diverticulitis s/p Exploratory laparotomy Lebron's resection and abdominal washout on 18, GIST tumor of stomach (s/p resection ~14yrs ago), RA, Parkinsons, Depression  Meds: HSQ. Hydroxycloraquine  ALL: NKDA  Social: Denies    T(F): 98.4 (19 @ 10:01), Max: 99.2 (19 @ 17:34)  HR: 80 (19 @ 10:01) (80 - 99)  BP: 121/74 (19 @ 10:01) (119/70 - 138/79)  RR: 18 (19 @ 10:01) (18 - 18)  SpO2: 96% (19 @ 10:01) (95% - 98%)  Wt(kg): --  I&O's Summary    2019 07:  -  2019 07:00  --------------------------------------------------------  IN: 940 mL / OUT: 1650 mL / NET: -710 mL    2019 07:  -  2019 11:43  --------------------------------------------------------  IN: 340 mL / OUT: 250 mL / NET: 90 mL    MEDICATIONS  (STANDING):  carbidopa/levodopa  25/100 1 Tablet(s) Oral <User Schedule>  escitalopram 5 milliGRAM(s) Oral daily  heparin  Injectable 5000 Unit(s) SubCutaneous every 8 hours  hydroxychloroquine 200 milliGRAM(s) Oral daily  mirtazapine 15 milliGRAM(s) Oral daily  potassium phosphate IVPB 15 milliMole(s) IV Intermittent once    MEDICATIONS  (PRN):      Physical Exam:  Constitutional: NAD  Abdomen: soft, nontender,  nondistended, no rebound or guarding  SVE: Deferred   Extremities: no lower extremity edema or calve tenderness    LABS:                        12.2   8.0   )-----------( 178      ( 2019 07:09 )             37.1     01-04    142  |  103  |  10  ----------------------------<  80  3.7   |  27  |  0.53    Ca    8.6      2019 07:09  Phos  2.2     01-04  Mg     2.2     01-04      PT/INR - ( 2019 09:59 )   PT: 13.6 sec;   INR: 1.18 ratio         PTT - ( 2019 09:59 )  PTT:46.4 sec      RADIOLOGY & ADDITIONAL TESTS:    Abdominal US(1/3):     FINDINGS:    Uterus: 6.3 x 2.7 x 3.3 cm. Within normal limits.    Endometrium: 3 mm with fluid in the endometrial cavity.    The left ovary measures 4.4 x 3.7 x 4.4 cm and contains a simple cyst   measuring 3.6 x 3.0 x 3.8 cm.    The right ovary measures 1.8 x 1.1 x 1.9 cm.    Within the left adnexa there is a hypoechoic tubular structure measuring   approximately 3.8 x 2.1 x 1.5 cm. This is in similar location to   hypoechoic tubular fluid in the left adnexa seen on CT abdomen pelvis   2018 may represent a dilated distal ureter or fallopian tube versus   loculated fluid.

## 2019-01-04 NOTE — PRE-ANESTHESIA EVALUATION ADULT - NSANTHOSAYNRD_GEN_A_CORE
No. HANG screening performed.  STOP BANG Legend: 0-2 = LOW Risk; 3-4 = INTERMEDIATE Risk; 5-8 = HIGH Risk

## 2019-01-04 NOTE — PROGRESS NOTE ADULT - ATTENDING COMMENTS
I saw and examined the pt and discussed the tx plan with the House Staff. I agree with the exam and plan as documented in the surgery resident's note from today.  Angela Garza MD

## 2019-01-04 NOTE — CONSULT NOTE ADULT - ATTENDING COMMENTS
Gyn att:   Discussed management options with patient including continued observation and surgical intervention at the time of planned surgery.  Reviewed risk, benefits and alternatives including but not limited to bleeding, infection, injury to surrounding organs such as bowel, bladder and ureter.  Chance of occult injury and need for further surgery.  Will send left adnexa for frozen section, possible hysterectomy and staging if malignant. All questions and concerns addressed, patient expressed understanding.  Consent signed.

## 2019-01-04 NOTE — BRIEF OPERATIVE NOTE - OPERATION/FINDINGS
Normal appearing six week sized uterus, left ovary and cyst measuring approximately 4 cm, normal appearing right ovary, normal fallopian tubes bilaterally
exploratory laparotomy, lysis of adhesions, reversal of dwayne's, resection of colostomy, mobilization of splenic flexure, resection of sigmoid stump, closure of mesenteric defect, anastomosis of descending colon to rectum with 28 EEA stapler, rigid sigmoidoscopy, negative leak test

## 2019-01-04 NOTE — BRIEF OPERATIVE NOTE - PROCEDURE
<<-----Click on this checkbox to enter Procedure Dilation and curettage  01/04/2019    Active  [id number]  Bilateral salpingo-oophorectomy (BSO)  01/04/2019    Active  [id number]

## 2019-01-04 NOTE — PROGRESS NOTE ADULT - SUBJECTIVE AND OBJECTIVE BOX
Surgery Progress Note    SUBJECTIVE: Pt seen and examined at bedside. Patient comfortable and in no-apparent distress. No nausea, vomiting, diarrhea. Tolerating diet.    Vital Signs Last 24 Hrs  T(C): 36.7 (03 Jan 2019 20:55), Max: 37.3 (03 Jan 2019 17:34)  T(F): 98 (03 Jan 2019 20:55), Max: 99.2 (03 Jan 2019 17:34)  HR: 99 (03 Jan 2019 20:55) (83 - 99)  BP: 126/72 (03 Jan 2019 20:55) (119/70 - 138/79)  BP(mean): --  RR: 18 (03 Jan 2019 20:55) (18 - 18)  SpO2: 96% (03 Jan 2019 20:55) (96% - 98%)    Physical Exam:  General Appearance: Appears well, NAD  Respiratory: No labored breathing  CV: Pulse regularly present  Abdomen: Soft, nontender, nondistended, ostomy with gas/stool in bag    LABS:                        11.6   12.1  )-----------( 199      ( 03 Jan 2019 12:41 )             34.5     01-03    142  |  103  |  17  ----------------------------<  149<H>  3.8   |  28  |  0.68    Ca    8.2<L>      03 Jan 2019 12:41  Phos  2.5     01-03  Mg     2.2     01-03      PT/INR - ( 03 Jan 2019 12:41 )   PT: 12.6 sec;   INR: 1.10 ratio         PTT - ( 03 Jan 2019 12:41 )  PTT:32.2 sec      INs and OUTs:    01-03-19 @ 07:01  -  01-04-19 @ 01:36  --------------------------------------------------------  IN: 820 mL / OUT: 1050 mL / NET: -230 mL Surgery Progress Note    SUBJECTIVE: Pt seen and examined at bedside. Patient comfortable and in no-apparent distress. No nausea, vomiting, diarrhea. Tolerating diet.    Vital Signs Last 24 Hrs  T(C): 36.7 (03 Jan 2019 20:55), Max: 37.3 (03 Jan 2019 17:34)  T(F): 98 (03 Jan 2019 20:55), Max: 99.2 (03 Jan 2019 17:34)  HR: 99 (03 Jan 2019 20:55) (83 - 99)  BP: 126/72 (03 Jan 2019 20:55) (119/70 - 138/79)  BP(mean): --  RR: 18 (03 Jan 2019 20:55) (18 - 18)  SpO2: 96% (03 Jan 2019 20:55) (96% - 98%)    Physical Exam:  General Appearance: Appears well, NAD  Respiratory: No labored breathing  CV: Pulse regularly present  Abdomen: Soft, nontender, nondistended, ostomy with clear liquid stool and gas in the bag    LABS:                        11.6   12.1  )-----------( 199      ( 03 Jan 2019 12:41 )             34.5     01-03    142  |  103  |  17  ----------------------------<  149<H>  3.8   |  28  |  0.68    Ca    8.2<L>      03 Jan 2019 12:41  Phos  2.5     01-03  Mg     2.2     01-03      PT/INR - ( 03 Jan 2019 12:41 )   PT: 12.6 sec;   INR: 1.10 ratio         PTT - ( 03 Jan 2019 12:41 )  PTT:32.2 sec      INs and OUTs:    01-03-19 @ 07:01  -  01-04-19 @ 01:36  --------------------------------------------------------  IN: 820 mL / OUT: 1050 mL / NET: -230 mL

## 2019-01-04 NOTE — BRIEF OPERATIVE NOTE - PROCEDURE
<<-----Click on this checkbox to enter Procedure Colostomy takedown  01/04/2019    Active  MEG  Exploratory laparotomy  01/04/2019    Active  MEG

## 2019-01-05 LAB
ANION GAP SERPL CALC-SCNC: 13 MMOL/L — SIGNIFICANT CHANGE UP (ref 5–17)
BUN SERPL-MCNC: 8 MG/DL — SIGNIFICANT CHANGE UP (ref 7–23)
CALCIUM SERPL-MCNC: 6.8 MG/DL — LOW (ref 8.4–10.5)
CANCER AG125 SERPL-ACNC: 16 U/ML — SIGNIFICANT CHANGE UP
CHLORIDE SERPL-SCNC: 105 MMOL/L — SIGNIFICANT CHANGE UP (ref 96–108)
CO2 SERPL-SCNC: 22 MMOL/L — SIGNIFICANT CHANGE UP (ref 22–31)
CREAT SERPL-MCNC: 0.46 MG/DL — LOW (ref 0.5–1.3)
GAS PNL BLDA: SIGNIFICANT CHANGE UP
GAS PNL BLDA: SIGNIFICANT CHANGE UP
GLUCOSE SERPL-MCNC: 129 MG/DL — HIGH (ref 70–99)
HCT VFR BLD CALC: 33 % — LOW (ref 34.5–45)
HGB BLD-MCNC: 10.9 G/DL — LOW (ref 11.5–15.5)
MAGNESIUM SERPL-MCNC: 2 MG/DL — SIGNIFICANT CHANGE UP (ref 1.6–2.6)
MCHC RBC-ENTMCNC: 31.5 PG — SIGNIFICANT CHANGE UP (ref 27–34)
MCHC RBC-ENTMCNC: 32.9 GM/DL — SIGNIFICANT CHANGE UP (ref 32–36)
MCV RBC AUTO: 95.6 FL — SIGNIFICANT CHANGE UP (ref 80–100)
PHOSPHATE SERPL-MCNC: 3.6 MG/DL — SIGNIFICANT CHANGE UP (ref 2.5–4.5)
PLATELET # BLD AUTO: 198 K/UL — SIGNIFICANT CHANGE UP (ref 150–400)
POTASSIUM SERPL-MCNC: 4 MMOL/L — SIGNIFICANT CHANGE UP (ref 3.5–5.3)
POTASSIUM SERPL-SCNC: 4 MMOL/L — SIGNIFICANT CHANGE UP (ref 3.5–5.3)
RBC # BLD: 3.45 M/UL — LOW (ref 3.8–5.2)
RBC # FLD: 12.9 % — SIGNIFICANT CHANGE UP (ref 10.3–14.5)
SODIUM SERPL-SCNC: 140 MMOL/L — SIGNIFICANT CHANGE UP (ref 135–145)
WBC # BLD: 7.3 K/UL — SIGNIFICANT CHANGE UP (ref 3.8–10.5)
WBC # FLD AUTO: 7.3 K/UL — SIGNIFICANT CHANGE UP (ref 3.8–10.5)

## 2019-01-05 PROCEDURE — 99291 CRITICAL CARE FIRST HOUR: CPT

## 2019-01-05 PROCEDURE — 71045 X-RAY EXAM CHEST 1 VIEW: CPT | Mod: 26

## 2019-01-05 RX ORDER — SODIUM CHLORIDE 9 MG/ML
250 INJECTION, SOLUTION INTRAVENOUS ONCE
Qty: 0 | Refills: 0 | Status: COMPLETED | OUTPATIENT
Start: 2019-01-05 | End: 2019-01-05

## 2019-01-05 RX ORDER — ACETAMINOPHEN 500 MG
600 TABLET ORAL ONCE
Qty: 0 | Refills: 0 | Status: COMPLETED | OUTPATIENT
Start: 2019-01-05 | End: 2019-01-05

## 2019-01-05 RX ORDER — CHLORHEXIDINE GLUCONATE 213 G/1000ML
1 SOLUTION TOPICAL
Qty: 0 | Refills: 0 | Status: DISCONTINUED | OUTPATIENT
Start: 2019-01-05 | End: 2019-01-06

## 2019-01-05 RX ORDER — ACETAMINOPHEN 500 MG
600 TABLET ORAL ONCE
Qty: 0 | Refills: 0 | Status: COMPLETED | OUTPATIENT
Start: 2019-01-06 | End: 2019-01-06

## 2019-01-05 RX ORDER — CALCIUM GLUCONATE 100 MG/ML
2 VIAL (ML) INTRAVENOUS ONCE
Qty: 0 | Refills: 0 | Status: COMPLETED | OUTPATIENT
Start: 2019-01-05 | End: 2019-01-05

## 2019-01-05 RX ORDER — SODIUM CHLORIDE 9 MG/ML
500 INJECTION, SOLUTION INTRAVENOUS ONCE
Qty: 0 | Refills: 0 | Status: COMPLETED | OUTPATIENT
Start: 2019-01-05 | End: 2019-01-05

## 2019-01-05 RX ADMIN — SODIUM CHLORIDE 250 MILLILITER(S): 9 INJECTION, SOLUTION INTRAVENOUS at 06:15

## 2019-01-05 RX ADMIN — HEPARIN SODIUM 5000 UNIT(S): 5000 INJECTION INTRAVENOUS; SUBCUTANEOUS at 05:59

## 2019-01-05 RX ADMIN — Medication 600 MILLIGRAM(S): at 23:36

## 2019-01-05 RX ADMIN — ESCITALOPRAM OXALATE 5 MILLIGRAM(S): 10 TABLET, FILM COATED ORAL at 12:08

## 2019-01-05 RX ADMIN — SODIUM CHLORIDE 100 MILLILITER(S): 9 INJECTION, SOLUTION INTRAVENOUS at 12:09

## 2019-01-05 RX ADMIN — SODIUM CHLORIDE 500 MILLILITER(S): 9 INJECTION, SOLUTION INTRAVENOUS at 09:00

## 2019-01-05 RX ADMIN — Medication 240 MILLIGRAM(S): at 07:33

## 2019-01-05 RX ADMIN — HEPARIN SODIUM 5000 UNIT(S): 5000 INJECTION INTRAVENOUS; SUBCUTANEOUS at 13:38

## 2019-01-05 RX ADMIN — SODIUM CHLORIDE 500 MILLILITER(S): 9 INJECTION, SOLUTION INTRAVENOUS at 10:00

## 2019-01-05 RX ADMIN — Medication 240 MILLIGRAM(S): at 23:21

## 2019-01-05 RX ADMIN — Medication 240 MILLIGRAM(S): at 18:51

## 2019-01-05 RX ADMIN — Medication 200 GRAM(S): at 04:26

## 2019-01-05 RX ADMIN — SODIUM CHLORIDE 1500 MILLILITER(S): 9 INJECTION, SOLUTION INTRAVENOUS at 03:00

## 2019-01-05 RX ADMIN — Medication 240 MILLIGRAM(S): at 13:38

## 2019-01-05 RX ADMIN — Medication 600 MILLIGRAM(S): at 19:07

## 2019-01-05 RX ADMIN — CARBIDOPA AND LEVODOPA 1 TABLET(S): 25; 100 TABLET ORAL at 12:08

## 2019-01-05 RX ADMIN — Medication 600 MILLIGRAM(S): at 14:10

## 2019-01-05 RX ADMIN — SODIUM CHLORIDE 3000 MILLILITER(S): 9 INJECTION, SOLUTION INTRAVENOUS at 13:00

## 2019-01-05 RX ADMIN — Medication 600 MILLIGRAM(S): at 08:15

## 2019-01-05 RX ADMIN — SODIUM CHLORIDE 1500 MILLILITER(S): 9 INJECTION, SOLUTION INTRAVENOUS at 00:06

## 2019-01-05 NOTE — PROGRESS NOTE ADULT - ATTENDING COMMENTS
Patient seen/examined.  Agree w above note and plan and have discussed plan w house staff.  comfortable, abdomen soft.  Somewhat hypotensive overnight, making 15-20 cc urine hourly, requiring low dose tomer.  ICU will push fluid, wean tomer.  Await SICU bed    Maged Tenorio MD

## 2019-01-05 NOTE — PROGRESS NOTE ADULT - ASSESSMENT
86 y/o F with hx of rheumatoid arthritis (severe b/l hands/fingers deformity), Parkinson's disease, depression/anxiety, GIST (s/p resection ~14 yrs ago), and perforated diverticulitis s/p ex-lap with Lebron's resection and abdominal washout on 9/22/18, admitted after 1/3/19 colonoscopy and now s/p ex-lap, GRACE, DNC/BSO, Lebron's reversal on 1/4    - Pain control  - NPO / IVF  - Wean tomer as tolerated  - Cont Mata  - Home meds  - Daily dressing/packing changes  - DVT ppx: SQH  - Appreciate SICU care    Dispo: SICU vs. floors    MELISSA Fernández, PGY-1  [facility]  [phone] with any questions

## 2019-01-05 NOTE — PROGRESS NOTE ADULT - SUBJECTIVE AND OBJECTIVE BOX
HISTORY  84 yo woman with a hx of RA, Parkinson disease, gastric GIST (s/p resection ~2005), perforated diverticulitis s/p ex-lap and Lebron resection in September 2018, s/p elective open colostomy takedown with low pelvic anastomosis. Additionally, the patient had a left ovarian cyst s/p D&C and BSO. Intraoperatively, the patient was intermittently on neosynephrine. EBL was 100, UOP 2500. Total crystalloid given not recorded.    When the patient arrived in PACU, she was hypotensive to the 80s -- she was given a push of neosynephrine, and started on a 500 cc crystalloid bolus. Upon SICU evaluation, her vitals were stable. /81, HR 83, saturation 100% on room air. She denied any pain.    24 HOUR EVENTS:    - Phenylephrine discontinued at 10am 1/5/19  - Patient received multiple LR boluses throughout the day on 1/5/19 with improvement in urine output      NEURO  Exam: A&O x 3  Meds: acetaminophen  IVPB .. 600 milliGRAM(s) IV Intermittent once  carbidopa/levodopa  25/100 1 Tablet(s) Oral <User Schedule>  escitalopram 5 milliGRAM(s) Oral daily  mirtazapine 15 milliGRAM(s) Oral daily    [x] Adequacy of sedation and pain control has been assessed and adjusted      RESPIRATORY  RR: 23 (01-05-19 @ 20:00) (13 - 25)  SpO2: 99% (01-05-19 @ 20:00) (86% - 100%)  Exam: unlabored breathing  ABG - ( 05 Jan 2019 06:12 )  pH: 7.38  /  pCO2: 44    /  pO2: 144   / HCO3: 26    / Base Excess: .8    /  SaO2: 99      Meds: None      CARDIOVASCULAR  HR: 98 (01-05-19 @ 20:00) (75 - 114)  BP: 125/60 (01-05-19 @ 19:00) (84/51 - 139/63)  BP(mean): 86 (01-05-19 @ 19:00) (62 - 89)  ABP: 124/59 (01-05-19 @ 20:00) (84/44 - 145/62)  ABP(mean): 88 (01-05-19 @ 20:00) (62 - 103)  Exam: Adequately perfused  Cardiac Rhythm: Sinus  Meds: None      GI/NUTRITION  Exam: Soft, appropriately tender, nondistended  Diet: NPO  Meds: None    GENITOURINARY  I&O's Detail    01-04 @ 07:01 - 01-05 @ 07:00  --------------------------------------------------------  IN:    IV PiggyBack: 348 mL    Lactated Ringers IV Bolus: 750 mL    lactated ringers.: 950 mL    Oral Fluid: 340 mL    phenylephrine   Infusion: 11.2 mL  Total IN: 2399.2 mL    OUT:    Indwelling Catheter - Urethral: 437 mL    Voided: 250 mL  Total OUT: 687 mL    Total NET: 1712.2 mL      01-05 @ 07:01 - 01-05 @ 20:04  --------------------------------------------------------  IN:    IV PiggyBack: 120 mL    Lactated Ringers IV Bolus: 1000 mL    lactated ringers.: 1100 mL    phenylephrine   Infusion: 24 mL  Total IN: 2244 mL    OUT:    Indwelling Catheter - Urethral: 570 mL  Total OUT: 570 mL    Total NET: 1674 mL      01-05    140  |  105  |  8   ----------------------------<  129<H>  4.0   |  22  |  0.46<L>    Ca    6.8<L>      05 Jan 2019 02:46  Phos  3.6     01-05  Mg     2.0     01-05    [X] Mata catheter  Meds: lactated ringers. 1000 milliLiter(s) IV Continuous <Continuous>      HEMATOLOGIC  Meds: heparin  Injectable 5000 Unit(s) SubCutaneous every 8 hours    [x] VTE Prophylaxis                        10.9   7.3   )-----------( 198      ( 05 Jan 2019 02:46 )             33.0     PT/INR - ( 04 Jan 2019 09:59 )   PT: 13.6 sec;   INR: 1.18 ratio         PTT - ( 04 Jan 2019 09:59 )  PTT:46.4 sec  Transfusion     [ ] PRBC   [ ] Platelets   [ ] FFP   [ ] Cryoprecipitate      INFECTIOUS DISEASES  T(C): 36.8 (01-05-19 @ 19:00), Max: 36.8 (01-05-19 @ 17:00)  WBC Count: 7.3 K/uL (01-05 @ 02:46)  WBC Count: 7.0 K/uL (01-04 @ 20:38)  Meds: None      ENDOCRINE  Meds: None      OTHER MEDICATIONS:  chlorhexidine 4% Liquid 1 Application(s) Topical <User Schedule>

## 2019-01-05 NOTE — PROGRESS NOTE ADULT - ATTENDING COMMENTS
seen and examined in PACU multiple times today    hypotension after Lebron's reversal on 1/4/2019  -on phenylephrine infusion @ 0.5 mcg/kg/min this morning  -weaned off with 1L LR fluid resuscitation  -no tachycardia which suggests adequate fluid resuscitation    acute renal failure with oliguria for multiple hours  -continue maintenance IVF  -bolus as needed  -transfer to SICU for close hemodynamic monitoring and to place FloTrac to assure adequately resuscitated      Critical Care Time - 30 minutes seen and examined in PACU multiple times today    hypotension after Lebron's reversal on 1/4/2019  -on phenylephrine infusion @ 0.5 mcg/kg/min this morning  -weaned off with 1L LR fluid resuscitation  -no tachycardia or metabolic acidosis to suggest inadequate fluid resuscitation    acute renal failure with oliguria for multiple hours  -continue maintenance IVF  -bolus as needed  -transfer to SICU for close hemodynamic monitoring and to place FloTrac to assure adequately resuscitated      Critical Care Time - 30 minutes

## 2019-01-05 NOTE — PROGRESS NOTE ADULT - ASSESSMENT
84 yo woman with a hx of RA, Parkinson disease, gastric GIST (s/p resection ~2005), perforated diverticulitis s/p ex-lap and Lebron resection in September 2018, s/p elective open colostomy takedown with low pelvic anastomosis, BSO, and D&C of left ovarian cyst. Hypotensive in PACU, resolved with a push of neosynephrine and 500 cc bolus. Required multiple fluid boluses to maintain BP and urine output. Now in SICU hemodynamically stable.    NEURO: acute post-operative pain control. Hx Parkinson's, depression/anxiety.  -continue home lexapro, mirtazapine, plaquinel, sinemet  -IV tylenol, fentanyl PRN    PULM: saturating well on room air.  - AM CXR    CV: hypotensive in PACU, resolved.  -LR @ 100 cc/hr    GI: s/p colostomy reversal.  -NPO except meds    : BUN/Cr stable, producing adequate urine.  -strict I/O  -daily labs in am    HEME: H/H stable postoperatively.  - SQH for VTE ppx    ID: afebrile, no leukocytosis, no infectious concerns at this time.  - Culture if febrile    ENDO: no acute issues.  - Monitor BMP glucose    DISPO: PACU

## 2019-01-05 NOTE — PROGRESS NOTE ADULT - SUBJECTIVE AND OBJECTIVE BOX
GENERAL SURGERY DAILY PROGRESS NOTE:    Interval:  In PACU, hypotensive/tachycardic. SICU consulted, pt started on tomer and given 250cc bolus x3.    Subjective:  Patient seen and examined. Reports pain is well controlled. Denies N/V. Denies flatus/BM.    Exam:  Gen: NAD, resting in bed, alert and responding appropriately  Resp: Airway patent, non-labored respirations  Abd: Soft, ND, appropriately tender, incisions dressed c/d/i, old ostomy site re-packed    Vital Signs Last 24 Hrs  T(C): 36.4 (05 Jan 2019 07:42), Max: 36.9 (04 Jan 2019 10:01)  T(F): 97.5 (05 Jan 2019 07:42), Max: 98.4 (04 Jan 2019 10:01)  HR: 100 (05 Jan 2019 08:30) (75 - 102)  BP: 106/58 (05 Jan 2019 08:30) (84/51 - 139/63)  BP(mean): 78 (05 Jan 2019 08:30) (62 - 89)  RR: 14 (05 Jan 2019 08:30) (14 - 18)  SpO2: 97% (05 Jan 2019 08:30) (86% - 100%)    I&O's Detail    04 Jan 2019 07:01  -  05 Jan 2019 07:00  --------------------------------------------------------  IN:    IV PiggyBack: 348 mL    Lactated Ringers IV Bolus: 750 mL    lactated ringers.: 950 mL    Oral Fluid: 340 mL    phenylephrine   Infusion: 11.2 mL  Total IN: 2399.2 mL    OUT:    Indwelling Catheter - Urethral: 437 mL    Voided: 250 mL  Total OUT: 687 mL    Total NET: 1712.2 mL      05 Jan 2019 07:01  -  05 Jan 2019 08:52  --------------------------------------------------------  IN:    IV PiggyBack: 60 mL    lactated ringers.: 100 mL    phenylephrine   Infusion: 8 mL  Total IN: 168 mL    OUT:    Indwelling Catheter - Urethral: 15 mL  Total OUT: 15 mL    Total NET: 153 mL      Daily Height in cm: 149.86 (04 Jan 2019 14:27)    Daily     MEDICATIONS  (STANDING):  carbidopa/levodopa  25/100 1 Tablet(s) Oral <User Schedule>  escitalopram 5 milliGRAM(s) Oral daily  heparin  Injectable 5000 Unit(s) SubCutaneous every 8 hours  lactated ringers Bolus 250 milliLiter(s) IV Bolus once  lactated ringers. 1000 milliLiter(s) (100 mL/Hr) IV Continuous <Continuous>  mirtazapine 15 milliGRAM(s) Oral daily  phenylephrine    Infusion 0.626 MICROgram(s)/kG/Min (10 mL/Hr) IV Continuous <Continuous>    MEDICATIONS  (PRN):      LABS:                        10.9   7.3   )-----------( 198      ( 05 Jan 2019 02:46 )             33.0     01-05    140  |  105  |  8   ----------------------------<  129<H>  4.0   |  22  |  0.46<L>    Ca    6.8<L>      05 Jan 2019 02:46  Phos  3.6     01-05  Mg     2.0     01-05      PT/INR - ( 04 Jan 2019 09:59 )   PT: 13.6 sec;   INR: 1.18 ratio         PTT - ( 04 Jan 2019 09:59 )  PTT:46.4 sec

## 2019-01-05 NOTE — CHART NOTE - NSCHARTNOTEFT_GEN_A_CORE
[facility] POST-OPERATIVE NOTE    Subjective:  Patient is s/p . Patient is being observed overnight in PACU under care of SICU. According to bedside nurse, patient had been tachcardic, hypotensive, and had desaturated earlier this evening. She received 0.1 of phenylephrine and was on 2L NC. Her HR has since come down to 93, her blood pressure has been SBP 120s on the A-line and her SpO2 has been at 100% with 2LNC and without labored breathing. At this time, the patient states she is comfortable, she denies any pain around her incisions, and denies any dizziness or light-headedness. She has been NPO and denies any n/v, chest pain, or SOB.     Objective:  Vital Signs Last 24 Hrs  T(C): 36.7 (04 Jan 2019 22:23), Max: 36.9 (04 Jan 2019 02:00)  T(F): 98.1 (04 Jan 2019 22:23), Max: 98.5 (04 Jan 2019 02:00)  HR: 93 (05 Jan 2019 00:15) (75 - 100)  BP: 108/54 (05 Jan 2019 00:15) (84/51 - 139/63)  BP(mean): 77 (05 Jan 2019 00:15) (62 - 89)  RR: 15 (05 Jan 2019 00:15) (15 - 18)  SpO2: 100% (05 Jan 2019 00:15) (86% - 100%)  I&O's Detail    03 Jan 2019 07:01  -  04 Jan 2019 07:00  --------------------------------------------------------  IN:    Oral Fluid: 940 mL  Total IN: 940 mL    OUT:    Colostomy: 350 mL    Voided: 1300 mL  Total OUT: 1650 mL    Total NET: -710 mL    04 Jan 2019 07:01  -  05 Jan 2019 00:23  --------------------------------------------------------  IN:    Lactated Ringers IV Bolus: 250 mL    lactated ringers.: 300 mL    Oral Fluid: 340 mL  Total IN: 890 mL    OUT:    Indwelling Catheter - Urethral: 240 mL    Voided: 250 mL  Total OUT: 490 mL    Total NET: 400 mL    heparin  Injectable 5000  phenylephrine    Infusion 0.626    Physical Exam:  General: NAD, resting comfortably in bed, conversive  Pulmonary: Nonlabored breathing, no respiratory distress on 2LNC  Cardiovascular: RRR HR 93 /76  Abdominal: soft, mildly distended, nontender, midline dressing in place with small amount of serosanguinous staining  Extremities: WWP  : lambert in place draining clear urine    LABS:                        11.4   7.0   )-----------( 202      ( 04 Jan 2019 20:38 )             33.9     01-04    138  |  105  |  7   ----------------------------<  131<H>  3.5   |  21<L>  |  0.43<L>    Ca    6.9<L>      04 Jan 2019 20:38  Phos  2.1     01-04  Mg     2.2     01-04    PT/INR - ( 04 Jan 2019 09:59 )   PT: 13.6 sec;   INR: 1.18 ratio      PTT - ( 04 Jan 2019 09:59 )  PTT:46.4 sec    Assessment:  85y Female, now several hours post-op from a s/p elective open colostomy takedown with low pelvic anastomosis, BSO, and D&C of left ovarian cyst. She was hypotensive, tachycardic and desaturating in the PACU, resolved with a push of neosynephrine, fluid bolus and 2LNC    Plan:  - NPO IVF  - Pain control as needed with PCA  - SQH for DVT ppx  - Cont lambert  - OOB and ambulating as tolerated  - F/u AM labs  - Care per SICU [facility] TEAM SURGERY POST-OPERATIVE NOTE    Subjective:  Patient is s/p s/p elective open colostomy takedown with low pelvic anastomosis, BSO, and D&C of left ovarian cyst. Frozen pathology for cyst was benign. Patient is being observed overnight in PACU under care of SICU. According to bedside nurse, patient had been tachcardic, hypotensive, and had desaturated earlier this evening. She received 0.1 of phenylephrine and was on 2L NC. Her HR has since come down to 93, her blood pressure has been SBP 120s on the A-line and her SpO2 has been at 100% with 2LNC and without labored breathing. At this time, the patient states she is comfortable, she denies any pain around her incisions, and denies any dizziness or light-headedness. She has been NPO and denies any n/v, chest pain, or SOB.     Objective:  Vital Signs Last 24 Hrs  T(C): 36.7 (04 Jan 2019 22:23), Max: 36.9 (04 Jan 2019 02:00)  T(F): 98.1 (04 Jan 2019 22:23), Max: 98.5 (04 Jan 2019 02:00)  HR: 93 (05 Jan 2019 00:15) (75 - 100)  BP: 108/54 (05 Jan 2019 00:15) (84/51 - 139/63)  BP(mean): 77 (05 Jan 2019 00:15) (62 - 89)  RR: 15 (05 Jan 2019 00:15) (15 - 18)  SpO2: 100% (05 Jan 2019 00:15) (86% - 100%)  I&O's Detail    03 Jan 2019 07:01  -  04 Jan 2019 07:00  --------------------------------------------------------  IN:    Oral Fluid: 940 mL  Total IN: 940 mL    OUT:    Colostomy: 350 mL    Voided: 1300 mL  Total OUT: 1650 mL    Total NET: -710 mL    04 Jan 2019 07:01  -  05 Jan 2019 00:23  --------------------------------------------------------  IN:    Lactated Ringers IV Bolus: 250 mL    lactated ringers.: 300 mL    Oral Fluid: 340 mL  Total IN: 890 mL    OUT:    Indwelling Catheter - Urethral: 240 mL    Voided: 250 mL  Total OUT: 490 mL    Total NET: 400 mL    heparin  Injectable 5000  phenylephrine    Infusion 0.626    Physical Exam:  General: NAD, resting comfortably in bed, conversive  Pulmonary: Nonlabored breathing, no respiratory distress on 2LNC  Cardiovascular: RRR HR 93 /76  Abdominal: soft, mildly distended, nontender, midline dressing in place with small amount of serosanguinous staining  Extremities: WWP  : lambert in place draining clear urine    LABS:                        11.4   7.0   )-----------( 202      ( 04 Jan 2019 20:38 )             33.9     01-04    138  |  105  |  7   ----------------------------<  131<H>  3.5   |  21<L>  |  0.43<L>    Ca    6.9<L>      04 Jan 2019 20:38  Phos  2.1     01-04  Mg     2.2     01-04    PT/INR - ( 04 Jan 2019 09:59 )   PT: 13.6 sec;   INR: 1.18 ratio      PTT - ( 04 Jan 2019 09:59 )  PTT:46.4 sec    Assessment:  85y Female, now several hours post-op from a s/p elective open colostomy takedown with low pelvic anastomosis, BSO, and D&C of left ovarian cyst. She was hypotensive, tachycardic and desaturating in the PACU, resolved with a push of neosynephrine, fluid bolus and 2LNC    Plan:  - NPO IVF  - Pain control as needed with PCA  - SQH for DVT ppx  - Cont lambert  - OOB and ambulating as tolerated  - F/u AM labs  - Care per SICU

## 2019-01-06 LAB
ANION GAP SERPL CALC-SCNC: 13 MMOL/L — SIGNIFICANT CHANGE UP (ref 5–17)
BUN SERPL-MCNC: 8 MG/DL — SIGNIFICANT CHANGE UP (ref 7–23)
CA-I BLD-SCNC: 1.1 MMOL/L — LOW (ref 1.12–1.3)
CALCIUM SERPL-MCNC: 7.3 MG/DL — LOW (ref 8.4–10.5)
CHLORIDE SERPL-SCNC: 107 MMOL/L — SIGNIFICANT CHANGE UP (ref 96–108)
CO2 SERPL-SCNC: 20 MMOL/L — LOW (ref 22–31)
CREAT SERPL-MCNC: 0.53 MG/DL — SIGNIFICANT CHANGE UP (ref 0.5–1.3)
GLUCOSE SERPL-MCNC: 72 MG/DL — SIGNIFICANT CHANGE UP (ref 70–99)
HCT VFR BLD CALC: 28.5 % — LOW (ref 34.5–45)
HGB BLD-MCNC: 9.3 G/DL — LOW (ref 11.5–15.5)
MAGNESIUM SERPL-MCNC: 1.8 MG/DL — SIGNIFICANT CHANGE UP (ref 1.6–2.6)
MCHC RBC-ENTMCNC: 31.5 PG — SIGNIFICANT CHANGE UP (ref 27–34)
MCHC RBC-ENTMCNC: 32.5 GM/DL — SIGNIFICANT CHANGE UP (ref 32–36)
MCV RBC AUTO: 96.8 FL — SIGNIFICANT CHANGE UP (ref 80–100)
PHOSPHATE SERPL-MCNC: 2.3 MG/DL — LOW (ref 2.5–4.5)
PLATELET # BLD AUTO: 168 K/UL — SIGNIFICANT CHANGE UP (ref 150–400)
POTASSIUM SERPL-MCNC: 3.9 MMOL/L — SIGNIFICANT CHANGE UP (ref 3.5–5.3)
POTASSIUM SERPL-SCNC: 3.9 MMOL/L — SIGNIFICANT CHANGE UP (ref 3.5–5.3)
RBC # BLD: 2.94 M/UL — LOW (ref 3.8–5.2)
RBC # FLD: 13.1 % — SIGNIFICANT CHANGE UP (ref 10.3–14.5)
SODIUM SERPL-SCNC: 140 MMOL/L — SIGNIFICANT CHANGE UP (ref 135–145)
WBC # BLD: 7.9 K/UL — SIGNIFICANT CHANGE UP (ref 3.8–10.5)
WBC # FLD AUTO: 7.9 K/UL — SIGNIFICANT CHANGE UP (ref 3.8–10.5)

## 2019-01-06 PROCEDURE — 71045 X-RAY EXAM CHEST 1 VIEW: CPT | Mod: 26

## 2019-01-06 PROCEDURE — 99232 SBSQ HOSP IP/OBS MODERATE 35: CPT

## 2019-01-06 RX ORDER — MAGNESIUM SULFATE 500 MG/ML
2 VIAL (ML) INJECTION ONCE
Qty: 0 | Refills: 0 | Status: COMPLETED | OUTPATIENT
Start: 2019-01-06 | End: 2019-01-06

## 2019-01-06 RX ORDER — CALCIUM GLUCONATE 100 MG/ML
1 VIAL (ML) INTRAVENOUS ONCE
Qty: 0 | Refills: 0 | Status: COMPLETED | OUTPATIENT
Start: 2019-01-06 | End: 2019-01-06

## 2019-01-06 RX ORDER — ENOXAPARIN SODIUM 100 MG/ML
40 INJECTION SUBCUTANEOUS EVERY 24 HOURS
Qty: 0 | Refills: 0 | Status: DISCONTINUED | OUTPATIENT
Start: 2019-01-06 | End: 2019-01-08

## 2019-01-06 RX ORDER — POTASSIUM PHOSPHATE, MONOBASIC POTASSIUM PHOSPHATE, DIBASIC 236; 224 MG/ML; MG/ML
15 INJECTION, SOLUTION INTRAVENOUS ONCE
Qty: 0 | Refills: 0 | Status: COMPLETED | OUTPATIENT
Start: 2019-01-06 | End: 2019-01-06

## 2019-01-06 RX ORDER — DEXTROSE MONOHYDRATE, SODIUM CHLORIDE, AND POTASSIUM CHLORIDE 50; .745; 4.5 G/1000ML; G/1000ML; G/1000ML
1000 INJECTION, SOLUTION INTRAVENOUS
Qty: 0 | Refills: 0 | Status: DISCONTINUED | OUTPATIENT
Start: 2019-01-06 | End: 2019-01-07

## 2019-01-06 RX ORDER — HYDROXYCHLOROQUINE SULFATE 200 MG
200 TABLET ORAL EVERY 24 HOURS
Qty: 0 | Refills: 0 | Status: DISCONTINUED | OUTPATIENT
Start: 2019-01-06 | End: 2019-01-08

## 2019-01-06 RX ADMIN — Medication 600 MILLIGRAM(S): at 06:00

## 2019-01-06 RX ADMIN — Medication 200 GRAM(S): at 04:23

## 2019-01-06 RX ADMIN — SODIUM CHLORIDE 100 MILLILITER(S): 9 INJECTION, SOLUTION INTRAVENOUS at 04:23

## 2019-01-06 RX ADMIN — Medication 50 GRAM(S): at 05:45

## 2019-01-06 RX ADMIN — ENOXAPARIN SODIUM 40 MILLIGRAM(S): 100 INJECTION SUBCUTANEOUS at 10:17

## 2019-01-06 RX ADMIN — Medication 200 MILLIGRAM(S): at 10:17

## 2019-01-06 RX ADMIN — CARBIDOPA AND LEVODOPA 1 TABLET(S): 25; 100 TABLET ORAL at 21:05

## 2019-01-06 RX ADMIN — ESCITALOPRAM OXALATE 5 MILLIGRAM(S): 10 TABLET, FILM COATED ORAL at 12:55

## 2019-01-06 RX ADMIN — CARBIDOPA AND LEVODOPA 1 TABLET(S): 25; 100 TABLET ORAL at 10:17

## 2019-01-06 RX ADMIN — CHLORHEXIDINE GLUCONATE 1 APPLICATION(S): 213 SOLUTION TOPICAL at 05:45

## 2019-01-06 RX ADMIN — Medication 240 MILLIGRAM(S): at 05:45

## 2019-01-06 RX ADMIN — DEXTROSE MONOHYDRATE, SODIUM CHLORIDE, AND POTASSIUM CHLORIDE 50 MILLILITER(S): 50; .745; 4.5 INJECTION, SOLUTION INTRAVENOUS at 10:17

## 2019-01-06 RX ADMIN — POTASSIUM PHOSPHATE, MONOBASIC POTASSIUM PHOSPHATE, DIBASIC 62.5 MILLIMOLE(S): 236; 224 INJECTION, SOLUTION INTRAVENOUS at 06:54

## 2019-01-06 NOTE — PHYSICAL THERAPY INITIAL EVALUATION ADULT - GAIT TRAINING, PT EVAL
GOAL: Patient will ambulate 200-300  feet independently with appropriate assistive device as needed, in 2 weeks.

## 2019-01-06 NOTE — PHYSICAL THERAPY INITIAL EVALUATION ADULT - LIVES WITH, PROFILE
Pt lives alone in an apartment complex. Pt reports 0 stairs to negotiate to enter./alone Pt lives in an apartment complex with 24 hour x 7 days HHA. Pt has 0 stairs to negotiate to enter./alone

## 2019-01-06 NOTE — PHYSICAL THERAPY INITIAL EVALUATION ADULT - DID THE PATIENT HAVE SURGERY?
s/p elective open colostomy takedown with low pelvic anastomosis, BSO, and D&C of left ovarian cyst./yes

## 2019-01-06 NOTE — PROGRESS NOTE ADULT - ATTENDING COMMENTS
Patient seen/examined.  Agree w above note and plan and have discussed plan w house staff.  Comfortable, afebrile.  Tony off.  Abdomen soft.  Start clears    Maged Tenorio MD

## 2019-01-06 NOTE — CHART NOTE - NSCHARTNOTEFT_GEN_A_CORE
GENERAL SURGERY FLOOR TRANSFER NOTE    85y Female transferred to floor from SICU    SUBJECTIVE:  Pt seen and examined at bedside. Reports pain is well controlled. Tolerating clears. Ambulated with PT earlier. +flatus and BM    OBJECTIVE:    T(C): 36.9 (01-06-19 @ 14:04), Max: 36.9 (01-06-19 @ 14:04)  HR: 104 (01-06-19 @ 14:04) (83 - 119)  BP: 116/75 (01-06-19 @ 14:04) (97/67 - 125/60)  RR: 21 (01-06-19 @ 14:04) (13 - 30)  SpO2: 97% (01-06-19 @ 14:04) (95% - 100%)  Wt(kg): --      I&O's Summary    05 Jan 2019 07:01  -  06 Jan 2019 07:00  --------------------------------------------------------  IN: 3639 mL / OUT: 1135 mL / NET: 2504 mL    06 Jan 2019 07:01  -  06 Jan 2019 14:09  --------------------------------------------------------  IN: 545 mL / OUT: 240 mL / NET: 305 mL                              9.3    7.9   )-----------( 168      ( 06 Jan 2019 03:19 )             28.5       01-06    140  |  107  |  8   ----------------------------<  72  3.9   |  20<L>  |  0.53    Ca    7.3<L>      06 Jan 2019 03:19  Phos  2.3     01-06  Mg     1.8     01-06        MEDICATIONS  (STANDING):  carbidopa/levodopa  25/100 1 Tablet(s) Oral <User Schedule>  dextrose 5% + sodium chloride 0.45% with potassium chloride 20 mEq/L 1000 milliLiter(s) (50 mL/Hr) IV Continuous <Continuous>  enoxaparin Injectable 40 milliGRAM(s) SubCutaneous every 24 hours  escitalopram 5 milliGRAM(s) Oral daily  hydroxychloroquine 200 milliGRAM(s) Oral every 24 hours  mirtazapine 15 milliGRAM(s) Oral daily    MEDICATIONS  (PRN):        PHYSICAL EXAM:  NAD, resting in bed comfortably, responding appropriately  Non labored respirations airway intact  Soft ND NT incisions dressed former ostomy site packed c/d/i  WWP      ASSESSMENT/PLAN:  84 y/o F with hx of rheumatoid arthritis (severe b/l hands/fingers deformity), Parkinson's disease, depression/anxiety, GIST (s/p resection ~14 yrs ago), and perforated diverticulitis s/p ex-lap with Lebron's resection and abdominal washout on 9/22/18, admitted after 1/3/19 colonoscopy and now s/p ex-lap, GRACE, DNC/BSO, Lebron's reversal on 1/4    - Pain control  - Clears / IVL when tolerating adequate po  - d/c Mata, TOV  - Home meds  - Daily dressing/packing changes  - DVT ppx: SQH    Dispo: parag Fernández, PGY-1  [facility]  [id number] with any questions

## 2019-01-06 NOTE — PHYSICAL THERAPY INITIAL EVALUATION ADULT - DIAGNOSIS, PT EVAL
Impaired mobility/function secondary to generalized weakness, decreased balance, decreased cognition, post -op discomfort.

## 2019-01-06 NOTE — PHYSICAL THERAPY INITIAL EVALUATION ADULT - IMPAIRMENTS CONTRIBUTING TO GAIT DEVIATIONS, PT EVAL
Pt required verbal cuing to stand upright and assist of PT to maneuver walker at times. Pt mildly unsteady with rolling walker./decreased strength/impaired balance/cognition/narrow base of support/impaired postural control

## 2019-01-06 NOTE — PROGRESS NOTE ADULT - ASSESSMENT
86 yo woman with a hx of RA, Parkinson disease, gastric GIST (s/p resection ~2005), perforated diverticulitis s/p ex-lap and Lebron resection in September 2018, s/p elective open colostomy takedown with low pelvic anastomosis, BSO, and D&C of left ovarian cyst. Hypotensive in PACU, resolved with a push of neosynephrine and 500 cc bolus. Required multiple fluid boluses to maintain BP and urine output. Now in SICU hemodynamically stable.    NEURO: acute post-operative pain control. Hx Parkinson's, depression/anxiety.  -continue home lexapro, mirtazapine, plaquinel, sinemet  -IV tylenol prn     PULM: saturating well on room air.  - AM CXR    CV: hypotensive in PACU, resolved.  -LR @ 100 cc/hr    GI: s/p colostomy reversal.  -NPO except meds  - Awaiting bowel function    : BUN/Cr stable, producing adequate urine.  -strict I/O  - d/c lambert     HEME: H/H stable postoperatively.  - SQH for VTE ppx    ID: afebrile, no leukocytosis, no infectious concerns at this time.  - Culture if febrile    ENDO: no acute issues.  - Monitor BMP glucose    DISPO:  Consider transfer to floor.  Full code.    Cherie Teresa PA-C #[id number] 86 yo woman with a hx of RA, Parkinson disease, gastric GIST (s/p resection ~2005), perforated diverticulitis s/p ex-lap and Lebron resection in September 2018, s/p elective open colostomy takedown with low pelvic anastomosis, BSO, and D&C of left ovarian cyst. Hypotensive in PACU, resolved with a push of neosynephrine and 500 cc bolus. Required multiple fluid boluses to maintain BP and urine output. Now in SICU hemodynamically stable.    NEURO: acute post-operative pain control. Hx Parkinson's, depression/anxiety.  -continue home lexapro, mirtazapine, plaquinel, sinemet  -IV tylenol prn     PULM: saturating well on room air.  - AM CXR    CV: hypotensive in PACU, resolved.  -LR @ 100 cc/hr    GI: s/p colostomy reversal.  -NPO except meds  - Awaiting bowel function    : BUN/Cr stable, producing adequate urine.  -strict I/O  - d/c lambert     HEME: H/H stable postoperatively.  - SQH for VTE ppx    ID: afebrile, no leukocytosis, no infectious concerns at this time.  - Culture if febrile    ENDO: no acute issues.  - Monitor BMP glucose    Lines:  - D/C radial A-line    DISPO:  Consider transfer to floor.  Full code.    Cherie Teresa PA-C #[id number]

## 2019-01-06 NOTE — PROGRESS NOTE ADULT - ATTENDING COMMENTS
s/p Lebron's reversal on 1/4/2019  -post-op hypotension has resolved    oliguric PATRICK has resolved  -stable for transfer to the floor    hypophosphatemia  -IV repletion

## 2019-01-06 NOTE — PROGRESS NOTE ADULT - SUBJECTIVE AND OBJECTIVE BOX
GENERAL SURGERY DAILY PROGRESS NOTE:    Interval:  No acute events overnight. Weaned of tomer.    Subjective:  Patient seen and examined. Reports pain is well controlled. Denies N/V. +BM.    Exam:  Gen: NAD, resting in bed, alert and responding appropriately  Resp: Airway patent, non-labored respirations  Abd: Soft, ND, appropriately tender, incisions dressed c/d/i, old ostomy site re-packed      Vital Signs Last 24 Hrs  T(C): 36.7 (2019 03:00), Max: 36.8 (2019 17:00)  T(F): 98 (2019 03:00), Max: 98.3 (2019 19:00)  HR: 83 (2019 06:00) (83 - 119)  BP: 107/51 (2019 02:00) (90/53 - 127/61)  BP(mean): 74 (2019 02:00) (67 - 88)  RR: 14 (2019 06:00) (13 - 26)  SpO2: 97% (2019 06:00) (94% - 100%)    I&O's Detail    2019 07:01  -  2019 07:00  --------------------------------------------------------  IN:    Lactated Ringers IV Bolus: 750 mL    lactated ringers.: 950 mL    Oral Fluid: 340 mL    phenylephrine   Infusion: 11.2 mL    Solution: 348 mL  Total IN: 2399.2 mL    OUT:    Indwelling Catheter - Urethral: 437 mL    Voided: 250 mL  Total OUT: 687 mL    Total NET: 1712.2 mL      2019 07:01  -  2019 06:41  --------------------------------------------------------  IN:    Lactated Ringers IV Bolus: 1000 mL    lactated ringers.: 2100 mL    phenylephrine   Infusion: 24 mL    Solution: 120 mL    Solution: 125 mL    Solution: 100 mL    Solution: 170 mL  Total IN: 3639 mL    OUT:    Indwelling Catheter - Urethral: 1055 mL  Total OUT: 1055 mL    Total NET: 2584 mL      Daily Weight in k.2 (2019 00:44)    MEDICATIONS  (STANDING):  carbidopa/levodopa  25/100 1 Tablet(s) Oral <User Schedule>  chlorhexidine 4% Liquid 1 Application(s) Topical <User Schedule>  escitalopram 5 milliGRAM(s) Oral daily  heparin  Injectable 5000 Unit(s) SubCutaneous every 8 hours  lactated ringers. 1000 milliLiter(s) (100 mL/Hr) IV Continuous <Continuous>  mirtazapine 15 milliGRAM(s) Oral daily  potassium phosphate IVPB 15 milliMole(s) IV Intermittent once    MEDICATIONS  (PRN):      LABS:                        9.3    7.9   )-----------( 168      ( 2019 03:19 )             28.5     01-06    140  |  107  |  8   ----------------------------<  72  3.9   |  20<L>  |  0.53    Ca    7.3<L>      2019 03:19  Phos  2.3     -  Mg     1.8     -06      PT/INR - ( 2019 09:59 )   PT: 13.6 sec;   INR: 1.18 ratio         PTT - ( 2019 09:59 )  PTT:46.4 sec

## 2019-01-06 NOTE — PHYSICAL THERAPY INITIAL EVALUATION ADULT - PERTINENT HX OF CURRENT PROBLEM, REHAB EVAL
Pt is an 86 y/o Female with hx of rheumatoid arthritis (severe b/l hands/fingers deformity), Parkinson's disease, depression/anxiety, GIST (s/p resection ~14 yrs ago), and perforated diverticulitis s/p ex-lap with Lebron's resection and abdominal washout on 9/22/18, admitted after 1/3/19 colonoscopy for pre-op optimization prior to scheduled Lebron's reversal on 1/4.  Continued...

## 2019-01-06 NOTE — PHYSICAL THERAPY INITIAL EVALUATION ADULT - PRECAUTIONS/LIMITATIONS, REHAB EVAL
Pt s/p elective open colostomy takedown with low pelvic anastomosis, BSO, and D&C of left ovarian cyst 1/4/19. Pt s/p elective open colostomy takedown with low pelvic anastomosis, BSO, and D&C of left ovarian cyst 1/4/19.  Hypotensive in PACU, resolved with a push of neosynephrine and 500 cc bolus. Required multiple fluid boluses to maintain BP and urine output. Now in SICU hemodynamically stable. Pt s/p elective open colostomy takedown with low pelvic anastomosis, BSO, and D&C of left ovarian cyst 1/4/19.  Hypotensive in PACU, resolved with a push of neosynephrine and 500 cc bolus. Required multiple fluid boluses to maintain BP and urine output. Now in SICU hemodynamically stable./fall precautions

## 2019-01-06 NOTE — PROGRESS NOTE ADULT - ASSESSMENT
84 y/o F with hx of rheumatoid arthritis (severe b/l hands/fingers deformity), Parkinson's disease, depression/anxiety, GIST (s/p resection ~14 yrs ago), and perforated diverticulitis s/p ex-lap with Lebron's resection and abdominal washout on 9/22/18, admitted after 1/3/19 colonoscopy and now s/p ex-lap, GRACE, DNC/BSO, Lebron's reversal on 1/4    - Pain control  - Rec advancing to NPO + sips/chips / IVF  - Cont Mata  - Home meds  - Daily dressing/packing changes  - DVT ppx: SQH  - Appreciate SICU care    Dispo: ANDREEA Fernández, PGY-1  [facility]  [phone] with any questions 84 y/o F with hx of rheumatoid arthritis (severe b/l hands/fingers deformity), Parkinson's disease, depression/anxiety, GIST (s/p resection ~14 yrs ago), and perforated diverticulitis s/p ex-lap with Lebron's resection and abdominal washout on 9/22/18, admitted after 1/3/19 colonoscopy and now s/p ex-lap, GRACE, DNC/BSO, Lebron's reversal on 1/4    - Pain control  - Clears / IVL when tolerating adequate po  - d/c Mata, TOV  - Home meds  - Daily dressing/packing changes  - DVT ppx: SQH  - Appreciate SICU care    Dispo: SICU to floors    MELISSA Fernández, PGY-1  [facility]  [phone] with any questions

## 2019-01-06 NOTE — PHYSICAL THERAPY INITIAL EVALUATION ADULT - GENERAL OBSERVATIONS, REHAB EVAL
Pt received OOB to recliner chair A & O &  2 (person, "hospital"). Pt confused t/o evaluation, required much encouragement and reassurance t/o evaluation.

## 2019-01-06 NOTE — PROGRESS NOTE ADULT - SUBJECTIVE AND OBJECTIVE BOX
86 yo woman with a hx of RA, Parkinson disease, gastric GIST (s/p resection ~2005), perforated diverticulitis s/p ex-lap and Lebron resection in September 2018, s/p elective open colostomy takedown with low pelvic anastomosis. Additionally, the patient had a left ovarian cyst s/p D&C and BSO. Intraoperatively, the patient was intermittently on neosynephrine. EBL was 100, UOP 2500. Total crystalloid given not recorded.  When the patient arrived in PACU, she was hypotensive to the 80s -- she was given a push of neosynephrine, and started on a 500 cc crystalloid bolus. Upon SICU evaluation, her vitals were stable. /81, HR 83, saturation 100% on room air. She denied any pain.    24 HOUR EVENTS: Pt given 500mL bolus of LR upon arrival to SICU and was weaned off Phenylephrine.  UOP improved.  Pt remained hemodynamically stable.     SUBJECTIVE/ROS:  [x ] A ten-point review of systems was otherwise negative except as noted.  [ ] Due to altered mental status/intubation, subjective information were not able to be obtained from the patient. History was obtained, to the extent possible, from review of the chart and collateral sources of information.      NEURO  RASS:     GCS:     CAM ICU:  Exam: Awake, alert, following commands. A&O x 3.   Meds: acetaminophen  IVPB .. 600 milliGRAM(s) IV Intermittent once  carbidopa/levodopa  25/100 1 Tablet(s) Oral <User Schedule>  escitalopram 5 milliGRAM(s) Oral daily  mirtazapine 15 milliGRAM(s) Oral daily    [x] Adequacy of sedation and pain control has been assessed and adjusted      RESPIRATORY  RR: 16 (01-06-19 @ 01:00) (13 - 25)  SpO2: 98% (01-06-19 @ 01:00) (94% - 100%)  Wt(kg): --  Exam: unlabored, clear to auscultation bilaterally  Mechanical Ventilation: N/A  ABG - ( 05 Jan 2019 06:12 )  pH: 7.38  /  pCO2: 44    /  pO2: 144   / HCO3: 26    / Base Excess: .8    /  SaO2: 99      Lactate: x        [ ] Extubation Readiness Assessed  Meds:       CARDIOVASCULAR  HR: 92 (01-06-19 @ 01:00) (77 - 114)  BP: 125/60 (01-05-19 @ 19:00) (85/59 - 127/61)  BP(mean): 86 (01-05-19 @ 19:00) (64 - 88)  ABP: 135/59 (01-06-19 @ 01:00) (87/44 - 145/62)  ABP(mean): 93 (01-06-19 @ 01:00) (62 - 103)  Wt(kg): --  CVP(cm H2O): --      Exam:  Cardiac Rhythm:  Perfusion     [x ]Adequate   [ ]Inadequate  Mentation   [x ]Normal       [ ]Reduced  Extremities  [x ]Warm         [ ]Cool  Volume Status [ ]Hypervolemic [x ]Euvolemic [ ]Hypovolemic  Meds:       GI/NUTRITION  Exam:  Diet: NPO   Meds:     GENITOURINARY  I&O's Detail    01-04 @ 07:01  -  01-05 @ 07:00  --------------------------------------------------------  IN:    IV PiggyBack: 348 mL    Lactated Ringers IV Bolus: 750 mL    lactated ringers.: 950 mL    Oral Fluid: 340 mL    phenylephrine   Infusion: 11.2 mL  Total IN: 2399.2 mL    OUT:    Indwelling Catheter - Urethral: 437 mL    Voided: 250 mL  Total OUT: 687 mL    Total NET: 1712.2 mL      01-05 @ 07:01  -  01-06 @ 01:29  --------------------------------------------------------  IN:    IV PiggyBack: 120 mL    Lactated Ringers IV Bolus: 1000 mL    lactated ringers.: 1600 mL    phenylephrine   Infusion: 24 mL    Solution: 60 mL  Total IN: 2804 mL    OUT:    Indwelling Catheter - Urethral: 890 mL  Total OUT: 890 mL    Total NET: 1914 mL      01-05    140  |  105  |  8   ----------------------------<  129<H>  4.0   |  22  |  0.46<L>    Ca    6.8<L>      05 Jan 2019 02:46  Phos  3.6     01-05  Mg     2.0     01-05      [x ] Mata catheter, indication: strict I's and O's  Meds: lactated ringers. 1000 milliLiter(s) IV Continuous <Continuous>      HEMATOLOGIC  Meds: heparin  Injectable 5000 Unit(s) SubCutaneous every 8 hours    [x] VTE Prophylaxis                        10.9   7.3   )-----------( 198      ( 05 Jan 2019 02:46 )             33.0     PT/INR - ( 04 Jan 2019 09:59 )   PT: 13.6 sec;   INR: 1.18 ratio         PTT - ( 04 Jan 2019 09:59 )  PTT:46.4 sec  Transfusion     [ ] PRBC   [ ] Platelets   [ ] FFP   [ ] Cryoprecipitate      INFECTIOUS DISEASES  T(C): 36.4 (01-05-19 @ 23:00), Max: 36.8 (01-05-19 @ 17:00)  Wt(kg): --  WBC Count: 7.3 K/uL (01-05 @ 02:46)    Recent Cultures:    Meds:       ENDOCRINE  Capillary Blood Glucose    Meds:       ACCESS DEVICES:  [x ] Peripheral IV  [ ] Central Venous Line	[ ] R	[ ] L	[ ] IJ	[ ] Fem	[ ] SC	Placed:   [x ] Arterial Line		[x ] R	[ ] L	[ ] Fem	[ x] Rad	[ ] Ax	Placed:   [ ] PICC:					[ ] Mediport  [x ] Urinary Catheter, Date Placed: 1/4  [x ] Necessity of urinary, arterial, and venous catheters discussed    OTHER MEDICATIONS:  chlorhexidine 4% Liquid 1 Application(s) Topical <User Schedule>      CODE STATUS: Full code     IMAGING: 86 yo woman with a hx of RA, Parkinson disease, gastric GIST (s/p resection ~2005), perforated diverticulitis s/p ex-lap and Lebron resection in September 2018, s/p elective open colostomy takedown with low pelvic anastomosis. Additionally, the patient had a left ovarian cyst s/p D&C and BSO. Intraoperatively, the patient was intermittently on neosynephrine. EBL was 100, UOP 2500. Total crystalloid given not recorded.  When the patient arrived in PACU, she was hypotensive to the 80s -- she was given a push of neosynephrine, and started on a 500 cc crystalloid bolus. Upon SICU evaluation, her vitals were stable. /81, HR 83, saturation 100% on room air. She denied any pain.    24 HOUR EVENTS: Pt given 500mL bolus of LR upon arrival to SICU and was weaned off Phenylephrine.  UOP improved.  Pt remained hemodynamically stable.  Pt had BM x 1 overnight.     SUBJECTIVE/ROS:  [x ] A ten-point review of systems was otherwise negative except as noted.  [ ] Due to altered mental status/intubation, subjective information were not able to be obtained from the patient. History was obtained, to the extent possible, from review of the chart and collateral sources of information.      NEURO  RASS:     GCS:     CAM ICU:  Exam: Awake, alert, following commands. A&O x 3. Flat affect.  Meds: acetaminophen  IVPB .. 600 milliGRAM(s) IV Intermittent once  carbidopa/levodopa  25/100 1 Tablet(s) Oral <User Schedule>  escitalopram 5 milliGRAM(s) Oral daily  mirtazapine 15 milliGRAM(s) Oral daily    [x] Adequacy of sedation and pain control has been assessed and adjusted      RESPIRATORY  RR: 16 (01-06-19 @ 01:00) (13 - 25)  SpO2: 98% (01-06-19 @ 01:00) (94% - 100%)  Wt(kg): --  Exam: unlabored, clear to auscultation bilaterally  Mechanical Ventilation: N/A  ABG - ( 05 Jan 2019 06:12 )  pH: 7.38  /  pCO2: 44    /  pO2: 144   / HCO3: 26    / Base Excess: .8    /  SaO2: 99      Lactate: x        [ ] Extubation Readiness Assessed  Meds:       CARDIOVASCULAR  HR: 92 (01-06-19 @ 01:00) (77 - 114)  BP: 125/60 (01-05-19 @ 19:00) (85/59 - 127/61)  BP(mean): 86 (01-05-19 @ 19:00) (64 - 88)  ABP: 135/59 (01-06-19 @ 01:00) (87/44 - 145/62)  ABP(mean): 93 (01-06-19 @ 01:00) (62 - 103)  Wt(kg): --  CVP(cm H2O): --    Exam: RRR. +S1, +S2  Cardiac Rhythm: Sinus   Perfusion     [x ]Adequate   [ ]Inadequate  Mentation   [x ]Normal       [ ]Reduced  Extremities  [x ]Warm         [ ]Cool  Volume Status [ ]Hypervolemic [x ]Euvolemic [ ]Hypovolemic  Meds:       GI/NUTRITION  Exam: +midline dressing clean, dry, intact. Soft, non-tender, non-distended.  Diet: NPO   Meds:     GENITOURINARY  I&O's Detail    01-04 @ 07:01  -  01-05 @ 07:00  --------------------------------------------------------  IN:    IV PiggyBack: 348 mL    Lactated Ringers IV Bolus: 750 mL    lactated ringers.: 950 mL    Oral Fluid: 340 mL    phenylephrine   Infusion: 11.2 mL  Total IN: 2399.2 mL    OUT:    Indwelling Catheter - Urethral: 437 mL    Voided: 250 mL  Total OUT: 687 mL    Total NET: 1712.2 mL      01-05 @ 07:01  -  01-06 @ 01:29  --------------------------------------------------------  IN:    IV PiggyBack: 120 mL    Lactated Ringers IV Bolus: 1000 mL    lactated ringers.: 1600 mL    phenylephrine   Infusion: 24 mL    Solution: 60 mL  Total IN: 2804 mL    OUT:    Indwelling Catheter - Urethral: 890 mL  Total OUT: 890 mL    Total NET: 1914 mL    01-06    140  |  107  |  8   ----------------------------<  72  3.9   |  20<L>  |  0.53    Ca    7.3<L>      06 Jan 2019 03:19  Phos  2.3     01-06  Mg     1.8     01-06      [x ] Mata catheter, indication: strict I's and O's  Meds: lactated ringers. 1000 milliLiter(s) IV Continuous <Continuous>      HEMATOLOGIC  Meds: heparin  Injectable 5000 Unit(s) SubCutaneous every 8 hours    [x] VTE Prophylaxis                                   9.3    7.9   )-----------( 168      ( 06 Jan 2019 03:19 )             28.5         Transfusion     [ ] PRBC   [ ] Platelets   [ ] FFP   [ ] Cryoprecipitate      INFECTIOUS DISEASES  T(C): 36.4 (01-05-19 @ 23:00), Max: 36.8 (01-05-19 @ 17:00)  Wt(kg): --  WBC Count: 7.9    Recent Cultures:    Meds:       ENDOCRINE  Capillary Blood Glucose    Meds:       ACCESS DEVICES:  [x ] Peripheral IV  [ ] Central Venous Line	[ ] R	[ ] L	[ ] IJ	[ ] Fem	[ ] SC	Placed:   [x ] Arterial Line		[x ] R	[ ] L	[ ] Fem	[ x] Rad	[ ] Ax	Placed:   [ ] PICC:					[ ] Mediport  [x ] Urinary Catheter, Date Placed: 1/4  [x ] Necessity of urinary, arterial, and venous catheters discussed    OTHER MEDICATIONS:  chlorhexidine 4% Liquid 1 Application(s) Topical <User Schedule>      CODE STATUS: Full code     IMAGING:

## 2019-01-06 NOTE — PHYSICAL THERAPY INITIAL EVALUATION ADULT - DISCHARGE DISPOSITION, PT EVAL
Home with home PT and assist with all functional activites Return home. Recommend Home PT for strength/balance training, progression of gait and  assist with all functional activities. Plan discussed with pt's daughter Rachael via telephone. Pt's daughter reports pt has a 24 hour x 7 days HHA. Aide will assist pt as recommended by PT.

## 2019-01-07 ENCOUNTER — TRANSCRIPTION ENCOUNTER (OUTPATIENT)
Age: 84
End: 2019-01-07

## 2019-01-07 LAB
ANION GAP SERPL CALC-SCNC: 9 MMOL/L — SIGNIFICANT CHANGE UP (ref 5–17)
BUN SERPL-MCNC: 8 MG/DL — SIGNIFICANT CHANGE UP (ref 7–23)
CALCIUM SERPL-MCNC: 8 MG/DL — LOW (ref 8.4–10.5)
CHLORIDE SERPL-SCNC: 109 MMOL/L — HIGH (ref 96–108)
CO2 SERPL-SCNC: 23 MMOL/L — SIGNIFICANT CHANGE UP (ref 22–31)
CREAT SERPL-MCNC: 0.51 MG/DL — SIGNIFICANT CHANGE UP (ref 0.5–1.3)
GLUCOSE SERPL-MCNC: 102 MG/DL — HIGH (ref 70–99)
HCT VFR BLD CALC: 28.8 % — LOW (ref 34.5–45)
HGB BLD-MCNC: 9.5 G/DL — LOW (ref 11.5–15.5)
MAGNESIUM SERPL-MCNC: 2.2 MG/DL — SIGNIFICANT CHANGE UP (ref 1.6–2.6)
MCHC RBC-ENTMCNC: 31.8 PG — SIGNIFICANT CHANGE UP (ref 27–34)
MCHC RBC-ENTMCNC: 33.2 GM/DL — SIGNIFICANT CHANGE UP (ref 32–36)
MCV RBC AUTO: 95.9 FL — SIGNIFICANT CHANGE UP (ref 80–100)
PHOSPHATE SERPL-MCNC: 1.6 MG/DL — LOW (ref 2.5–4.5)
PLATELET # BLD AUTO: 180 K/UL — SIGNIFICANT CHANGE UP (ref 150–400)
POTASSIUM SERPL-MCNC: 4.1 MMOL/L — SIGNIFICANT CHANGE UP (ref 3.5–5.3)
POTASSIUM SERPL-SCNC: 4.1 MMOL/L — SIGNIFICANT CHANGE UP (ref 3.5–5.3)
RBC # BLD: 3 M/UL — LOW (ref 3.8–5.2)
RBC # FLD: 13.3 % — SIGNIFICANT CHANGE UP (ref 10.3–14.5)
SODIUM SERPL-SCNC: 141 MMOL/L — SIGNIFICANT CHANGE UP (ref 135–145)
WBC # BLD: 7 K/UL — SIGNIFICANT CHANGE UP (ref 3.8–10.5)
WBC # FLD AUTO: 7 K/UL — SIGNIFICANT CHANGE UP (ref 3.8–10.5)

## 2019-01-07 RX ORDER — ACETAMINOPHEN 500 MG
650 TABLET ORAL EVERY 6 HOURS
Qty: 0 | Refills: 0 | Status: DISCONTINUED | OUTPATIENT
Start: 2019-01-07 | End: 2019-01-08

## 2019-01-07 RX ORDER — MAGNESIUM OXIDE 400 MG ORAL TABLET 241.3 MG
1000 TABLET ORAL
Qty: 0 | Refills: 0 | Status: DISCONTINUED | OUTPATIENT
Start: 2019-01-07 | End: 2019-01-08

## 2019-01-07 RX ORDER — ACETAMINOPHEN 500 MG
650 TABLET ORAL EVERY 6 HOURS
Qty: 0 | Refills: 0 | Status: DISCONTINUED | OUTPATIENT
Start: 2019-01-07 | End: 2019-01-07

## 2019-01-07 RX ADMIN — MAGNESIUM OXIDE 400 MG ORAL TABLET 1000 MILLIGRAM(S): 241.3 TABLET ORAL at 09:48

## 2019-01-07 RX ADMIN — MIRTAZAPINE 15 MILLIGRAM(S): 45 TABLET, ORALLY DISINTEGRATING ORAL at 05:16

## 2019-01-07 RX ADMIN — ENOXAPARIN SODIUM 40 MILLIGRAM(S): 100 INJECTION SUBCUTANEOUS at 09:48

## 2019-01-07 RX ADMIN — Medication 200 MILLIGRAM(S): at 11:29

## 2019-01-07 RX ADMIN — ESCITALOPRAM OXALATE 5 MILLIGRAM(S): 10 TABLET, FILM COATED ORAL at 13:31

## 2019-01-07 RX ADMIN — Medication 62.5 MILLIMOLE(S): at 17:18

## 2019-01-07 RX ADMIN — CARBIDOPA AND LEVODOPA 1 TABLET(S): 25; 100 TABLET ORAL at 21:24

## 2019-01-07 RX ADMIN — CARBIDOPA AND LEVODOPA 1 TABLET(S): 25; 100 TABLET ORAL at 09:48

## 2019-01-07 RX ADMIN — Medication 62.5 MILLIMOLE(S): at 11:29

## 2019-01-07 NOTE — PROGRESS NOTE ADULT - SUBJECTIVE AND OBJECTIVE BOX
GENERAL SURGERY DAILY PROGRESS NOTE:    Interval:  No acute events overnight.    Subjective:  Patient seen and examined. Reports pain is well controlled. Denies N/V. Tolerating clears. Passing flatus, +BM.    Exam:  Gen: NAD, resting in bed, alert and responding appropriately  Resp: Airway patent, non-labored respirations  Abd: Soft, ND, appropriately tender, incisions dressed c/d/i, old ostomy site re-packed    Vital Signs Last 24 Hrs  T(C): 36.6 (07 Jan 2019 00:59), Max: 36.9 (06 Jan 2019 14:04)  T(F): 97.8 (07 Jan 2019 00:59), Max: 98.5 (06 Jan 2019 14:04)  HR: 75 (07 Jan 2019 00:59) (75 - 119)  BP: 131/80 (07 Jan 2019 00:59) (97/67 - 131/81)  BP(mean): 82 (06 Jan 2019 13:30) (73 - 82)  RR: 18 (07 Jan 2019 00:59) (14 - 30)  SpO2: 97% (07 Jan 2019 00:59) (95% - 98%)    I&O's Detail    05 Jan 2019 07:01  -  06 Jan 2019 07:00  --------------------------------------------------------  IN:    Lactated Ringers IV Bolus: 1000 mL    lactated ringers.: 2100 mL    phenylephrine   Infusion: 24 mL    Solution: 100 mL    Solution: 170 mL    Solution: 120 mL    Solution: 125 mL  Total IN: 3639 mL    OUT:    Indwelling Catheter - Urethral: 1135 mL  Total OUT: 1135 mL    Total NET: 2504 mL      06 Jan 2019 07:01  -  07 Jan 2019 01:27  --------------------------------------------------------  IN:    dextrose 5% + sodium chloride 0.45% with potassium chloride 20 mEq/L: 750 mL    Oral Fluid: 480 mL    Solution: 125 mL  Total IN: 1355 mL    OUT:    Indwelling Catheter - Urethral: 40 mL    Voided: 350 mL  Total OUT: 390 mL    Total NET: 965 mL      MEDICATIONS  (STANDING):  carbidopa/levodopa  25/100 1 Tablet(s) Oral <User Schedule>  dextrose 5% + sodium chloride 0.45% with potassium chloride 20 mEq/L 1000 milliLiter(s) (50 mL/Hr) IV Continuous <Continuous>  enoxaparin Injectable 40 milliGRAM(s) SubCutaneous every 24 hours  escitalopram 5 milliGRAM(s) Oral daily  hydroxychloroquine 200 milliGRAM(s) Oral every 24 hours  mirtazapine 15 milliGRAM(s) Oral daily    MEDICATIONS  (PRN):      LABS:                        9.3    7.9   )-----------( 168      ( 06 Jan 2019 03:19 )             28.5     01-06    140  |  107  |  8   ----------------------------<  72  3.9   |  20<L>  |  0.53    Ca    7.3<L>      06 Jan 2019 03:19  Phos  2.3     01-06  Mg     1.8     01-06

## 2019-01-07 NOTE — DISCHARGE NOTE ADULT - MEDICATION SUMMARY - MEDICATIONS TO TAKE
I will START or STAY ON the medications listed below when I get home from the hospital:    acetaminophen 325 mg oral tablet  -- 2 tab(s) by mouth every 6 hours, As needed, Mild Pain (1 - 3)  -- Indication: For Pain control    oxyCODONE 5 mg oral tablet  -- 1 tab(s) by mouth every 6 hours, As Needed -for severe pain MDD:4 tabs   -- Caution federal law prohibits the transfer of this drug to any person other  than the person for whom it was prescribed.  It is very important that you take or use this exactly as directed.  Do not skip doses or discontinue unless directed by your doctor.  May cause drowsiness.  Alcohol may intensify this effect.  Use care when operating dangerous machinery.  This prescription cannot be refilled.  Using more of this medication than prescribed may cause serious breathing problems.    -- Indication: For For severe pain as needed    mirtazapine 15 mg oral tablet  -- 1 tab(s) by mouth once a day (at bedtime)  -- Indication: For Home med    Lexapro 5 mg oral tablet  -- 1 tab(s) by mouth once a day  -- Indication: For Home med    Plaquenil 200 mg oral tablet  -- 1 tab(s) by mouth once a day  -- Indication: For Home med    Sinemet 25 mg-100 mg oral tablet  -- 1 tab(s) by mouth 2 times a day  -- Indication: For Home med I will START or STAY ON the medications listed below when I get home from the hospital:    acetaminophen 325 mg oral tablet  -- 2 tab(s) by mouth every 6 hours, As needed, Mild Pain (1 - 3)  -- Indication: For Pain control    oxyCODONE 5 mg oral tablet  -- 1 tab(s) by mouth every 6 hours, As Needed -for severe pain MDD:4 tabs   -- Caution federal law prohibits the transfer of this drug to any person other  than the person for whom it was prescribed.  It is very important that you take or use this exactly as directed.  Do not skip doses or discontinue unless directed by your doctor.  May cause drowsiness.  Alcohol may intensify this effect.  Use care when operating dangerous machinery.  This prescription cannot be refilled.  Using more of this medication than prescribed may cause serious breathing problems.    -- Indication: For For severe pain as needed    mirtazapine 15 mg oral tablet  -- 1 tab(s) by mouth once a day (at bedtime)  -- Indication: For Home med    Lexapro 5 mg oral tablet  -- 1 tab(s) by mouth once a day  -- Indication: For Home med    Plaquenil 200 mg oral tablet  -- 1 tab(s) by mouth once a day  -- Indication: For Home med    Sinemet 25 mg-100 mg oral tablet  -- 1 tab(s) by mouth 2 times a day  -- Indication: For Home med    Colace 100 mg oral capsule  -- 1 cap(s) by mouth 2 times a day   -- Medication should be taken with plenty of water.    -- Indication: For For constipation

## 2019-01-07 NOTE — PROGRESS NOTE ADULT - ATTENDING COMMENTS
I saw and examined the pt and discussed the tx plan with the House Staff. I agree with the exam and plan as documented in the surgery resident's note from today.  + bowel fx.  Abdomen soft, ND, with appropriate tenderness.  Stable, ambulate, LRD.   Angela Garza MD

## 2019-01-07 NOTE — DISCHARGE NOTE ADULT - NS AS ACTIVITY OBS
Do not make important decisions/Do not drive or operate machinery/if taking pain medications/No Heavy lifting/straining/Showering allowed

## 2019-01-07 NOTE — DISCHARGE NOTE ADULT - HOSPITAL COURSE
85 years old female H/O Rheumatoid arthritis (severe bilateral hands fingers deformity), Parkinson disease, Depression/Anxiety, GIST tumor of stomach (s/p resection ~14yrs ago), Perforated Diverticulitis s/p Exploratory laparotomy Lebron's resection and abdominal washout on 9/22/18. Pt wishes to proceed with colostomy reversal and now scheduled for ERAS Open Colostomy Takedown with Low Pelvic Anastomosis on 1/4/19. Denies any palpitation, SOB, N/V, fever or chills.     Pt admitted to floor, for ERP protocol and  bowel prep. GYN consulted for incidental L ovarian cyst. Given that patient is post-menopausal will perform a D&C and BSO at the time of colostomy reversal per gyn. On 1/4 , pt underwent  elective open colostomy takedown with low pelvic anastomosis, BSO, and D&C of left ovarian cyst. Intraoperatively, the patient was intermittently on neosynephrine. EBL was 100, UOP 2500. Total crystalloid given not recorded.   She tolerated procedure well, was extubated and sent to pacu.  When the patient arrived in PACU, she was hypotensive to the 80s -- she was given a push of neosynephrine, and started on a 500 cc crystalloid bolus. Upon SICU evaluation, her vitals were stable. /81, HR 83, saturation 100% on room air. She denied any pain.   POD1- Phenylephrine discontinued at 10am 1/5/19.  Patient received multiple LR boluses throughout the day on 1/5/19 with improvement in urine output.  POD2, UOP improved.  Pt remained hemodynamically stable.  Pt had BM x 1 overnight.  PT eval: Home withHome PT, rolling walker.  Pt transferred from sicu to floor 2 [facility].  POD3, diet adv to LRD. At the time of discharge, the patient was hemodynamically stable, was tolerating PO diet, was voiding urine and passing stool, was ambulating, and was comfortable with adequate pain control. The patient was instructed to follow up with Dr. Taylor within 1-2 weeks after discharge from the hospital. The patient felt comfortable with discharge. The patient was discharged to home w home PT, and VNS for packing changes at old ostomy site. The patient had no other issues. 85 years old female H/O Rheumatoid arthritis (severe bilateral hands fingers deformity), Parkinson disease, Depression/Anxiety, GIST tumor of stomach (s/p resection ~14yrs ago), Perforated Diverticulitis s/p Exploratory laparotomy Lebron's resection and abdominal washout on 9/22/18. Pt wishes to proceed with colostomy reversal and now scheduled for ERAS Open Colostomy Takedown with Low Pelvic Anastomosis on 1/4/19. Denies any palpitation, SOB, N/V, fever or chills.     Pt admitted to floor, for ERP protocol and  bowel prep. GYN consulted for incidental L ovarian cyst. Given that patient is post-menopausal will perform a D&C and BSO at the time of colostomy reversal per gyn. On 1/4 , pt underwent  elective open colostomy takedown with low pelvic anastomosis, BSO, and D&C of left ovarian cyst. Intraoperatively, the patient was intermittently on neosynephrine. EBL was 100, UOP 2500. Total crystalloid given not recorded.   She tolerated procedure well, was extubated and sent to pacu.  When the patient arrived in PACU, she was hypotensive to the 80s -- she was given a push of neosynephrine, and started on a 500 cc crystalloid bolus. Upon SICU evaluation, her vitals were stable. /81, HR 83, saturation 100% on room air. She denied any pain.   POD1- Phenylephrine discontinued at 10am 1/5/19.  Patient received multiple LR boluses throughout the day on 1/5/19 with improvement in urine output.  POD2, UOP improved.  Pt remained hemodynamically stable.  Pt had BM x 1 overnight.  PT eval: Home withHome PT, rolling walker.  Pt transferred from sicu to floor 2 [facility].  POD3, diet adv to LRD. At the time of discharge, the patient was hemodynamically stable, was tolerating PO diet, was voiding urine and passing stool, was ambulating, and was comfortable with adequate pain control. The patient was instructed to follow up with Dr. Garza within 1-2 weeks after discharge from the hospital. The patient felt comfortable with discharge. The patient was discharged to home w home PT, and VNS for packing changes at old ostomy site. The patient had no other issues.

## 2019-01-07 NOTE — PROVIDER CONTACT NOTE (MEDICATION) - RECOMMENDATIONS
pt educated on importance of medication. pt verbalized understanding, still continues to refuse medication at this time.

## 2019-01-07 NOTE — DISCHARGE NOTE ADULT - PATIENT PORTAL LINK FT
You can access the [facility] Patient Portal, offered by [facility], by registering with the following website: [email or web address]

## 2019-01-07 NOTE — PROGRESS NOTE ADULT - ASSESSMENT
86 y/o F with hx of rheumatoid arthritis (severe b/l hands/fingers deformity), Parkinson's disease, depression/anxiety, GIST (s/p resection ~14 yrs ago), and perforated diverticulitis s/p ex-lap with Lebron's resection and abdominal washout on 9/22/18, admitted after 1/3/19 colonoscopy and now s/p ex-lap, GRACE, DNC/BSO, Lebron's reversal on 1/4    - Pain control  - Clears / IVL when tolerating adequate po  - Home meds  - Daily dressing/packing changes  - DVT ppx: SQH  - PT recs: home PT    Dispo: parag Fernández, PGY-1  [facility]  [id number] with any questions

## 2019-01-07 NOTE — DISCHARGE NOTE ADULT - FINDINGS/TREATMENT
· Operative Findings	exploratory laparotomy, lysis of adhesions, reversal of dwayne's, resection of colostomy, mobilization of splenic flexure, resection of sigmoid stump, closure of mesenteric defect, anastomosis of descending colon to rectum with 28 EEA stapler, rigid sigmoidoscopy, negative leak test see op note · Operative Findings	Normal appearing six week sized uterus, left ovary and cyst measuring approximately 4 cm, normal appearing right ovary, normal fallopian tubes bilaterally

## 2019-01-07 NOTE — DISCHARGE NOTE ADULT - PLAN OF CARE
WOUND CARE: Staples will be removed at follow up office visit. Old Ostomy Site: continue daily packing changes with 1 inch plain packing, cover with gauze and paper tape.    BATHING: Please do not submerge wound underwater. You may shower and/or sponge bathe.  ACTIVITY: No heavy lifting anything more than 10-15lbs or straining. Otherwise, you may return to your usual level of physical activity. If you are taking narcotic pain medication (such as Percocet), do NOT drive a car, operate machinery or make important decisions.  DIET: Low fiber diet  NOTIFY YOUR SURGEON IF: You have any bleeding that does not stop, any pus draining from your wound, any fever (over 100.4 F) or chills, persistent nausea/vomiting with inability to tolerate food or liquids, persistent diarrhea, or if your pain is not controlled on your discharge pain medications.  FOLLOW-UP:  1. Please call to make a follow-up appointment within one week of discharge.   2. Please follow up with your primary care physician in one week regarding your hospitalization. s/p reversal PAIN CONTROL: You may take 650 mg of Tylenol every 6 hours. Do not exceed 3 grams of Tylenol daily. Take oxycodone as needed for severe pain, one tab every 6 hours. Do not exceed 4 tabs daily.  WOUND CARE: You will be discharged with staples in your incision. They will be removed in the office. Old Ostomy Site: continue daily packing changes with 1 inch plain packing, cover with gauze and paper tape.    BATHING: Please do not submerge wound underwater. You may shower and/or sponge bathe.  ACTIVITY: No heavy lifting anything more than 10-15lbs or straining. Otherwise, you may return to your usual level of physical activity. If you are taking narcotic pain medication (such as oxycodone), do NOT drive a car, operate machinery or make important decisions.  DIET: Low fiber diet  NOTIFY YOUR SURGEON IF: You have any bleeding that does not stop, any pus draining from your wound, any fever (over 100.4 F) or chills, persistent nausea/vomiting with inability to tolerate food or liquids, persistent diarrhea, or if your pain is not controlled on your discharge pain medications.  FOLLOW-UP:  1. Please follow up with Dr. Garza in 1-2 weeks following discharge from the hospital. You may call [phone] to schedule an appointment.    2. Please follow up with your primary care physician regarding your hospitalization.

## 2019-01-07 NOTE — DISCHARGE NOTE ADULT - CARE PLAN
Principal Discharge DX:	Colostomy in place  Goal:	s/p reversal  Assessment and plan of treatment:	WOUND CARE: Staples will be removed at follow up office visit. Old Ostomy Site: continue daily packing changes with 1 inch plain packing, cover with gauze and paper tape.    BATHING: Please do not submerge wound underwater. You may shower and/or sponge bathe.  ACTIVITY: No heavy lifting anything more than 10-15lbs or straining. Otherwise, you may return to your usual level of physical activity. If you are taking narcotic pain medication (such as Percocet), do NOT drive a car, operate machinery or make important decisions.  DIET: Low fiber diet  NOTIFY YOUR SURGEON IF: You have any bleeding that does not stop, any pus draining from your wound, any fever (over 100.4 F) or chills, persistent nausea/vomiting with inability to tolerate food or liquids, persistent diarrhea, or if your pain is not controlled on your discharge pain medications.  FOLLOW-UP:  1. Please call to make a follow-up appointment within one week of discharge.   2. Please follow up with your primary care physician in one week regarding your hospitalization. Principal Discharge DX:	Colostomy in place  Goal:	s/p reversal  Assessment and plan of treatment:	PAIN CONTROL: You may take 650 mg of Tylenol every 6 hours. Do not exceed 3 grams of Tylenol daily. Take oxycodone as needed for severe pain, one tab every 6 hours. Do not exceed 4 tabs daily.  WOUND CARE: You will be discharged with staples in your incision. They will be removed in the office. Old Ostomy Site: continue daily packing changes with 1 inch plain packing, cover with gauze and paper tape.    BATHING: Please do not submerge wound underwater. You may shower and/or sponge bathe.  ACTIVITY: No heavy lifting anything more than 10-15lbs or straining. Otherwise, you may return to your usual level of physical activity. If you are taking narcotic pain medication (such as oxycodone), do NOT drive a car, operate machinery or make important decisions.  DIET: Low fiber diet  NOTIFY YOUR SURGEON IF: You have any bleeding that does not stop, any pus draining from your wound, any fever (over 100.4 F) or chills, persistent nausea/vomiting with inability to tolerate food or liquids, persistent diarrhea, or if your pain is not controlled on your discharge pain medications.  FOLLOW-UP:  1. Please follow up with Dr. Garza in 1-2 weeks following discharge from the hospital. You may call [phone] to schedule an appointment.    2. Please follow up with your primary care physician regarding your hospitalization.

## 2019-01-07 NOTE — DISCHARGE NOTE ADULT - CARE PROVIDER_API CALL
Angela Garza), ColonRectal Surgery; Surgery  [address] [facility]  [address] [place] [id number]  Phone: [phone]  Fax: [phone]

## 2019-01-08 VITALS
RESPIRATION RATE: 16 BRPM | SYSTOLIC BLOOD PRESSURE: 138 MMHG | TEMPERATURE: 98 F | HEART RATE: 82 BPM | DIASTOLIC BLOOD PRESSURE: 81 MMHG | OXYGEN SATURATION: 97 %

## 2019-01-08 LAB
ANION GAP SERPL CALC-SCNC: 8 MMOL/L — SIGNIFICANT CHANGE UP (ref 5–17)
BUN SERPL-MCNC: 8 MG/DL — SIGNIFICANT CHANGE UP (ref 7–23)
CALCIUM SERPL-MCNC: 8.1 MG/DL — LOW (ref 8.4–10.5)
CHLORIDE SERPL-SCNC: 107 MMOL/L — SIGNIFICANT CHANGE UP (ref 96–108)
CO2 SERPL-SCNC: 27 MMOL/L — SIGNIFICANT CHANGE UP (ref 22–31)
CREAT SERPL-MCNC: 0.44 MG/DL — LOW (ref 0.5–1.3)
GLUCOSE SERPL-MCNC: 111 MG/DL — HIGH (ref 70–99)
HCT VFR BLD CALC: 30.7 % — LOW (ref 34.5–45)
HGB BLD-MCNC: 9.5 G/DL — LOW (ref 11.5–15.5)
MAGNESIUM SERPL-MCNC: 2.1 MG/DL — SIGNIFICANT CHANGE UP (ref 1.6–2.6)
MCHC RBC-ENTMCNC: 29.4 PG — SIGNIFICANT CHANGE UP (ref 27–34)
MCHC RBC-ENTMCNC: 30.9 GM/DL — LOW (ref 32–36)
MCV RBC AUTO: 95 FL — SIGNIFICANT CHANGE UP (ref 80–100)
PHOSPHATE SERPL-MCNC: 2.5 MG/DL — SIGNIFICANT CHANGE UP (ref 2.5–4.5)
PLATELET # BLD AUTO: 272 K/UL — SIGNIFICANT CHANGE UP (ref 150–400)
POTASSIUM SERPL-MCNC: 4 MMOL/L — SIGNIFICANT CHANGE UP (ref 3.5–5.3)
POTASSIUM SERPL-SCNC: 4 MMOL/L — SIGNIFICANT CHANGE UP (ref 3.5–5.3)
RBC # BLD: 3.23 M/UL — LOW (ref 3.8–5.2)
RBC # FLD: 14.8 % — HIGH (ref 10.3–14.5)
SODIUM SERPL-SCNC: 142 MMOL/L — SIGNIFICANT CHANGE UP (ref 135–145)
WBC # BLD: 7.84 K/UL — SIGNIFICANT CHANGE UP (ref 3.8–10.5)
WBC # FLD AUTO: 7.84 K/UL — SIGNIFICANT CHANGE UP (ref 3.8–10.5)

## 2019-01-08 PROCEDURE — 85027 COMPLETE CBC AUTOMATED: CPT

## 2019-01-08 PROCEDURE — 82803 BLOOD GASES ANY COMBINATION: CPT

## 2019-01-08 PROCEDURE — 88331 PATH CONSLTJ SURG 1 BLK 1SPC: CPT

## 2019-01-08 PROCEDURE — 84100 ASSAY OF PHOSPHORUS: CPT

## 2019-01-08 PROCEDURE — 84132 ASSAY OF SERUM POTASSIUM: CPT

## 2019-01-08 PROCEDURE — 71045 X-RAY EXAM CHEST 1 VIEW: CPT

## 2019-01-08 PROCEDURE — C1889: CPT

## 2019-01-08 PROCEDURE — 88302 TISSUE EXAM BY PATHOLOGIST: CPT

## 2019-01-08 PROCEDURE — 85610 PROTHROMBIN TIME: CPT

## 2019-01-08 PROCEDURE — 85014 HEMATOCRIT: CPT

## 2019-01-08 PROCEDURE — 86850 RBC ANTIBODY SCREEN: CPT

## 2019-01-08 PROCEDURE — 80048 BASIC METABOLIC PNL TOTAL CA: CPT

## 2019-01-08 PROCEDURE — 97162 PT EVAL MOD COMPLEX 30 MIN: CPT

## 2019-01-08 PROCEDURE — 82947 ASSAY GLUCOSE BLOOD QUANT: CPT

## 2019-01-08 PROCEDURE — 86900 BLOOD TYPING SEROLOGIC ABO: CPT

## 2019-01-08 PROCEDURE — 82435 ASSAY OF BLOOD CHLORIDE: CPT

## 2019-01-08 PROCEDURE — 76856 US EXAM PELVIC COMPLETE: CPT

## 2019-01-08 PROCEDURE — 83735 ASSAY OF MAGNESIUM: CPT

## 2019-01-08 PROCEDURE — 84295 ASSAY OF SERUM SODIUM: CPT

## 2019-01-08 PROCEDURE — 88305 TISSUE EXAM BY PATHOLOGIST: CPT

## 2019-01-08 PROCEDURE — 83605 ASSAY OF LACTIC ACID: CPT

## 2019-01-08 PROCEDURE — 88304 TISSUE EXAM BY PATHOLOGIST: CPT

## 2019-01-08 PROCEDURE — 86901 BLOOD TYPING SEROLOGIC RH(D): CPT

## 2019-01-08 PROCEDURE — 82565 ASSAY OF CREATININE: CPT

## 2019-01-08 PROCEDURE — 85730 THROMBOPLASTIN TIME PARTIAL: CPT

## 2019-01-08 PROCEDURE — 82330 ASSAY OF CALCIUM: CPT

## 2019-01-08 PROCEDURE — 86304 IMMUNOASSAY TUMOR CA 125: CPT

## 2019-01-08 PROCEDURE — 88307 TISSUE EXAM BY PATHOLOGIST: CPT

## 2019-01-08 RX ORDER — SODIUM,POTASSIUM PHOSPHATES 278-250MG
2 POWDER IN PACKET (EA) ORAL ONCE
Qty: 0 | Refills: 0 | Status: COMPLETED | OUTPATIENT
Start: 2019-01-08 | End: 2019-01-08

## 2019-01-08 RX ORDER — ACETAMINOPHEN 500 MG
2 TABLET ORAL
Qty: 0 | Refills: 0 | COMMUNITY
Start: 2019-01-08

## 2019-01-08 RX ORDER — DOCUSATE SODIUM 100 MG
1 CAPSULE ORAL
Qty: 60 | Refills: 0 | OUTPATIENT
Start: 2019-01-08

## 2019-01-08 RX ORDER — OXYCODONE HYDROCHLORIDE 5 MG/1
1 TABLET ORAL
Qty: 12 | Refills: 0 | OUTPATIENT
Start: 2019-01-08

## 2019-01-08 RX ADMIN — MAGNESIUM OXIDE 400 MG ORAL TABLET 1000 MILLIGRAM(S): 241.3 TABLET ORAL at 11:20

## 2019-01-08 RX ADMIN — ENOXAPARIN SODIUM 40 MILLIGRAM(S): 100 INJECTION SUBCUTANEOUS at 11:16

## 2019-01-08 RX ADMIN — Medication 200 MILLIGRAM(S): at 11:16

## 2019-01-08 RX ADMIN — Medication 2 PACKET(S): at 11:15

## 2019-01-08 RX ADMIN — CARBIDOPA AND LEVODOPA 1 TABLET(S): 25; 100 TABLET ORAL at 11:16

## 2019-01-08 RX ADMIN — MIRTAZAPINE 15 MILLIGRAM(S): 45 TABLET, ORALLY DISINTEGRATING ORAL at 05:09

## 2019-01-08 NOTE — PROGRESS NOTE ADULT - ATTENDING COMMENTS
I saw and examined the pt and discussed the tx plan with the House Staff. I agree with the exam and plan as documented in the surgery resident's note from today.  Home later today if no issues.  Angela Garza MD

## 2019-01-08 NOTE — PROGRESS NOTE ADULT - ASSESSMENT
86 y/o F with hx of rheumatoid arthritis (severe b/l hands/fingers deformity), Parkinson's disease, depression/anxiety, GIST (s/p resection ~14 yrs ago), and perforated diverticulitis s/p ex-lap with Lebron's resection and abdominal washout on 9/22/18, admitted after 1/3/19 colonoscopy and now s/p ex-lap, GRACE, DNC/BSO, Lebron's reversal on 1/4. recovering well.    PLAN:  - Dispo: home w/ home PT today.  - Pain control  - tolerating lfd.  - Home meds  - Daily dressing/packing changes  - DVT ppx: lovenox

## 2019-01-08 NOTE — PROGRESS NOTE ADULT - SUBJECTIVE AND OBJECTIVE BOX
General Surgery Progress Note    SUBJECTIVE:  The patient was seen and examined. No acute events overnight.  Denies N/V. Tolerating lfd. Passing flatus, +BM.      OBJECTIVE:     ** VITAL SIGNS / I&O's **    Vital Signs Last 24 Hrs  T(C): 37.2 (07 Jan 2019 21:24), Max: 37.4 (07 Jan 2019 17:22)  T(F): 98.9 (07 Jan 2019 21:24), Max: 99.4 (07 Jan 2019 17:22)  HR: 84 (07 Jan 2019 21:24) (75 - 100)  BP: 146/76 (07 Jan 2019 21:24) (98/62 - 146/82)  BP(mean): --  RR: 17 (07 Jan 2019 21:24) (16 - 18)  SpO2: 94% (07 Jan 2019 21:24) (94% - 98%)      06 Jan 2019 07:01  -  07 Jan 2019 07:00  --------------------------------------------------------  IN:    dextrose 5% + sodium chloride 0.45% with potassium chloride 20 mEq/L: 1350 mL    Oral Fluid: 480 mL    Solution: 125 mL  Total IN: 1955 mL    OUT:    Indwelling Catheter - Urethral: 40 mL    Voided: 350 mL  Total OUT: 390 mL    Total NET: 1565 mL      07 Jan 2019 07:01  -  08 Jan 2019 00:07  --------------------------------------------------------  IN:    Oral Fluid: 1140 mL    Solution: 500 mL  Total IN: 1640 mL    OUT:  Total OUT: 0 mL    Total NET: 1640 mL          ** PHYSICAL EXAM **    -- CONSTITUTIONAL: Alert, NAD  -- PULMONARY: non-labored respirations  -- ABDOMEN: Soft, ND, appropriately tender, incisions dressed c/d/i, old ostomy site re-packed      ** LABS **                          9.5    7.0   )-----------( 180      ( 07 Jan 2019 09:22 )             28.8     07 Jan 2019 08:39    141    |  109    |  8      ----------------------------<  102    4.1     |  23     |  0.51     Ca    8.0        07 Jan 2019 08:39  Phos  1.6       07 Jan 2019 08:39  Mg     2.2       07 Jan 2019 08:39        CAPILLARY BLOOD GLUCOSE                    MEDICATIONS  (STANDING):  carbidopa/levodopa  25/100 1 Tablet(s) Oral <User Schedule>  enoxaparin Injectable 40 milliGRAM(s) SubCutaneous every 24 hours  escitalopram 5 milliGRAM(s) Oral daily  hydroxychloroquine 200 milliGRAM(s) Oral every 24 hours  magnesium oxide 1000 milliGRAM(s) Oral two times a day with meals  mirtazapine 15 milliGRAM(s) Oral daily    MEDICATIONS  (PRN):  acetaminophen   Tablet .. 650 milliGRAM(s) Oral every 6 hours PRN Mild Pain (1 - 3)

## 2019-01-15 PROBLEM — Z93.3 COLOSTOMY IN PLACE: Status: RESOLVED | Noted: 2018-10-10 | Resolved: 2019-01-15

## 2019-01-16 ENCOUNTER — APPOINTMENT (OUTPATIENT)
Dept: SURGERY | Facility: CLINIC | Age: 84
End: 2019-01-16
Payer: MEDICARE

## 2019-01-16 VITALS
OXYGEN SATURATION: 95 % | TEMPERATURE: 98.2 F | DIASTOLIC BLOOD PRESSURE: 79 MMHG | WEIGHT: 85 LBS | HEART RATE: 95 BPM | HEIGHT: 58 IN | RESPIRATION RATE: 18 BRPM | BODY MASS INDEX: 17.84 KG/M2 | SYSTOLIC BLOOD PRESSURE: 125 MMHG

## 2019-01-16 DIAGNOSIS — Z93.3 COLOSTOMY STATUS: ICD-10-CM

## 2019-01-16 PROCEDURE — 99024 POSTOP FOLLOW-UP VISIT: CPT

## 2019-01-16 RX ORDER — CIPROFLOXACIN HYDROCHLORIDE 250 MG/1
250 TABLET, FILM COATED ORAL
Qty: 3 | Refills: 0 | Status: DISCONTINUED | COMMUNITY
Start: 2018-12-28 | End: 2019-01-16

## 2019-01-16 RX ORDER — METRONIDAZOLE 250 MG/1
250 TABLET ORAL
Qty: 3 | Refills: 0 | Status: DISCONTINUED | COMMUNITY
Start: 2018-12-28 | End: 2019-01-16

## 2019-01-21 NOTE — PHYSICAL EXAM
[FreeTextEntry1] : Abdomen soft, non-tender, non-distended. Incisions healing well. Staples removed. No hernias. LLQ open wound clean, repacked with saline moist packing strip.

## 2019-01-21 NOTE — ASSESSMENT
[FreeTextEntry1] : Doing well overall postop.\par Has constipation, instructed to take Miralax bid for the next few days, OK to take prune juice, continue Colace.  \par Instructions for diet, activity, wound care given, all questions answered.\par RTO in 10-14 days.\par \par

## 2019-01-21 NOTE — HISTORY OF PRESENT ILLNESS
[FreeTextEntry1] : Feels well. Reports no discomfort. Has not required anything for pain, including Tylenol. + flatus and tolerating po. However her stool has been getting hard and she has not had a BM since 2 days ago. She had diarrhea when she first went home, so she was not started on the recommended Colace bid for a few days, then as per caregiver she has been taking Colace once a day as well as Miralax, unclear how long she has been taking Miralax. She has baseline constipation and was requiring daily Miralax and prune juice preop.

## 2019-01-21 NOTE — REASON FOR VISIT
[Post Op: _________] : a [unfilled] post op visit [Formal Caregiver] : formal caregiver [Family Member] : family member [FreeTextEntry1] : Open Silva's reversal with low pelvic anastomosis and takedown of splenic flexure 01/04/19

## 2019-01-21 NOTE — CONSULT LETTER
[Dear  ___] : Dear ~MALLY, [Courtesy Letter:] : I had the pleasure of seeing your patient, [unfilled], in my office today. [Please see my note below.] : Please see my note below. [Sincerely,] : Sincerely, [FreeTextEntry2] : MADDIE Eduardo [FreeTextEntry1] : She is recovering well from her colostomy reversal. My office will send you her Operation and Pathology reports for your records. I am sending a quick note your way to update you. Please do not hesitate to contact me with any questions.\par \par \par \par \par \par  [FreeTextEntry3] : Angela Garza M.D., F.A.C.S., F.EUGENE.S.C.R.S.\par Assistant Professor of Surgery\par [employer] at [facility]\par \par

## 2019-01-30 ENCOUNTER — APPOINTMENT (OUTPATIENT)
Dept: SURGERY | Facility: CLINIC | Age: 84
End: 2019-01-30
Payer: MEDICARE

## 2019-01-30 VITALS
HEART RATE: 86 BPM | OXYGEN SATURATION: 99 % | HEIGHT: 58 IN | RESPIRATION RATE: 17 BRPM | TEMPERATURE: 98.3 F | BODY MASS INDEX: 17.84 KG/M2 | DIASTOLIC BLOOD PRESSURE: 69 MMHG | WEIGHT: 85 LBS | SYSTOLIC BLOOD PRESSURE: 119 MMHG

## 2019-01-30 PROCEDURE — 99024 POSTOP FOLLOW-UP VISIT: CPT

## 2019-02-02 NOTE — HISTORY OF PRESENT ILLNESS
[FreeTextEntry1] : 84 y/o female here for a post op visit. She feels well. Reports no discomfort. Has not required anything for pain, including Tylenol. + flatus and tolerating po. + daily BMs with Miralax and Colace (has baseline constipation).

## 2019-02-02 NOTE — ASSESSMENT
[FreeTextEntry1] : Doing well postop.\par Has baseline constipation currently controlled well with Miralax and Colace, instructed to transition to high fiber diet and wean off as tolerated from the Colace. She is going on a trip in 10 days, instructed her to continue the MIralax until she returns from her trip, then try to wean it off.   \par Instructions for high fiber diet, activity, wound care given, all questions answered.\par RTO in 4 weeks.\par \par

## 2019-02-02 NOTE — CONSULT LETTER
[Dear  ___] : Dear ~MALLY, [Courtesy Letter:] : I had the pleasure of seeing your patient, [unfilled], in my office today. [Please see my note below.] : Please see my note below. [Sincerely,] : Sincerely, [FreeTextEntry2] : MADDIE Eduardo [FreeTextEntry1] : \par \par  [FreeTextEntry3] : Angela Garza M.D., F.A.C.S., F.EUGENE.S.C.R.S.\par Assistant Professor of Surgery\par [employer] at [facility]\par \par

## 2019-02-02 NOTE — PHYSICAL EXAM
[FreeTextEntry1] : Comfortable. \par Abdomen soft, non-tender, non-distended. The incisions have healed. No hernias.

## 2019-03-02 ENCOUNTER — TRANSCRIPTION ENCOUNTER (OUTPATIENT)
Age: 84
End: 2019-03-02

## 2019-03-06 ENCOUNTER — APPOINTMENT (OUTPATIENT)
Dept: SURGERY | Facility: CLINIC | Age: 84
End: 2019-03-06
Payer: MEDICARE

## 2019-03-06 VITALS
OXYGEN SATURATION: 98 % | RESPIRATION RATE: 17 BRPM | DIASTOLIC BLOOD PRESSURE: 72 MMHG | SYSTOLIC BLOOD PRESSURE: 117 MMHG | HEART RATE: 85 BPM | BODY MASS INDEX: 17.84 KG/M2 | HEIGHT: 58 IN | TEMPERATURE: 97.7 F | WEIGHT: 85 LBS

## 2019-03-06 DIAGNOSIS — Z09 ENCOUNTER FOR FOLLOW-UP EXAMINATION AFTER COMPLETED TREATMENT FOR CONDITIONS OTHER THAN MALIGNANT NEOPLASM: ICD-10-CM

## 2019-03-06 PROCEDURE — 99024 POSTOP FOLLOW-UP VISIT: CPT

## 2019-03-17 PROBLEM — Z09 POSTOPERATIVE EXAMINATION: Status: ACTIVE | Noted: 2018-10-09

## 2019-03-17 NOTE — HISTORY OF PRESENT ILLNESS
[FreeTextEntry1] : 84 y/o female here for a post op visit. She has been doing well postop. Has baseline constipation currently controlled well with Miralax and Colace, I instructed her in the last visit to transition to high fiber diet and wean off as tolerated from the Colace. \par Patient reports feeling well. Denies pain. Moving bowels daily. Having some fiber in her diet. Continues with Miralax and Colace.

## 2019-03-17 NOTE — ASSESSMENT
[FreeTextEntry1] : Doing well postop.\par Has baseline constipation currently controlled with Miralax and Colace, looks that she has not been taking in much fiber in diet. I instructed her to take fiber supplements bid and wean off as tolerated from the Colace and if possible from the Miralax as well. \par RTO in 6 weeks.\par \par

## 2019-04-23 NOTE — CONSULT NOTE ADULT - PROBLEM SELECTOR RECOMMENDATION 9
NONCOMPLIANT, HAS ALREADY RECEIVED CERTIFIED LETTER.   
sec to bowel peroforation due to sigmoid diverticulitis  OR c/s polymicrobials including klebsiella and aeromonas  cont vanco and zoysn for now  vanoc level
severe sepsis with septic shock,  C/w Surgical management  ID consult for management of IV antibiotics

## 2019-04-24 ENCOUNTER — APPOINTMENT (OUTPATIENT)
Dept: SURGERY | Facility: CLINIC | Age: 84
End: 2019-04-24
Payer: MEDICARE

## 2019-04-24 VITALS
DIASTOLIC BLOOD PRESSURE: 84 MMHG | RESPIRATION RATE: 16 BRPM | TEMPERATURE: 98.4 F | HEART RATE: 89 BPM | OXYGEN SATURATION: 99 % | SYSTOLIC BLOOD PRESSURE: 132 MMHG

## 2019-04-24 DIAGNOSIS — Z09 ENCOUNTER FOR FOLLOW-UP EXAMINATION AFTER COMPLETED TREATMENT FOR CONDITIONS OTHER THAN MALIGNANT NEOPLASM: ICD-10-CM

## 2019-04-24 PROCEDURE — 99212 OFFICE O/P EST SF 10 MIN: CPT

## 2019-04-25 PROBLEM — Z09 FOLLOW-UP EXAMINATION AFTER COLORECTAL SURGERY: Status: ACTIVE | Noted: 2019-04-25

## 2019-04-25 NOTE — HISTORY OF PRESENT ILLNESS
[de-identified] : 86 y/o female here for a f/u visit after colostomy reversal and for management of bowel fx. She has been doing well. Has baseline constipation; last seen on 03/06/19 and instructed to stop the Colace, take Benefiber bid and wean down Miralax as tolerated. \par Today the patient reports feeling well. Takes Miralax every other day. Has stopped the Colace. Takes Benefiber 2-3 times a day. \par Patient moving bowels daily without constipation.\par Has gained weight. \par

## 2019-04-25 NOTE — CONSULT LETTER
[Dear  ___] : Dear ~MALLY, [Courtesy Letter:] : I had the pleasure of seeing your patient, [unfilled], in my office today. [Please see my note below.] : Please see my note below. [Sincerely,] : Sincerely, [FreeTextEntry2] : MADDIE Eduardo [FreeTextEntry3] : Angela Garza M.D., F.A.C.S., F.EUGENE.S.C.R.S.\par Assistant Professor of Surgery\par [employer] at [facility]\par \par  [FreeTextEntry1] : I am sending a quick note your way to update you. Please do not hesitate to contact me with any questions.\par \par \par \par \par

## 2019-04-25 NOTE — REASON FOR VISIT
[Follow-Up: _____] : a [unfilled] follow-up visit [Formal Caregiver] : formal caregiver [Family Member] : family member [FreeTextEntry1] : Open Silva's reversal with low pelvic anastomosis and takedown of splenic flexure 01/04/19. \par \par

## 2019-04-25 NOTE — ASSESSMENT
[FreeTextEntry1] : Doing very well.\par Current bowel regimen works well for her. Instructed to continue the Benefiber, may try to decrease Miralax to every 3 days as tolerated. \par RTO as needed. \par \par

## 2019-05-10 NOTE — PRE-OP CHECKLIST - PATIENT'S PERSONAL PROPERTY GIVEN TO
on unit
F: none  E: replete PRN  N: dash/tlc, CC    #prophylactic measure  VTE: home eliquis  GI: home PPI    FULL CODE  dispo: PHILLIP

## 2019-09-30 NOTE — CHART NOTE - NSCHARTNOTEFT_GEN_A_CORE
Called by RN and nursing supervisor at [facility] stating that they did not have a prescription for Zosyn for the patient. Abx confirmed with Dr. Stephanie Pena x 2 weeks. Prescription written and hand delivered to the nursing supervisor at [facility]. RN aware. room air

## 2021-01-27 NOTE — PROVIDER CONTACT NOTE (CRITICAL VALUE NOTIFICATION) - PERSON GIVING RESULT:
I reviewed the H&P, I examined the patient, and there are no changes in the patient's condition.    
Demetra Walton
Demetra Walton/ lab
Ladarius RODRIGUEZ/Parul

## 2021-06-23 NOTE — DIETITIAN INITIAL EVALUATION ADULT. - PHYSICAL APPEARANCE
Unable to assess at this time due to patient’s acuity underweight/other (specify)/Pt appears thin; BMI=19.2; +1 gen edema; Nutrition focused physical exam conducted; Subcutaneous fat loss: [WNL ] Orbital fat pads region, [MIld ]Buccal fat region, [severe ]Triceps region,  [unable ]Ribs region.  Muscle wasting: [moderate ]Temples region, [mild ]Clavicle region, [mild ]Shoulder region, [unable ]Scapula region, [contracted due to RA ]Interosseous region,  [moderate ]thigh region, [Moderate ]Calf region

## 2023-05-23 NOTE — PATIENT PROFILE ADULT. - NS TRANSFER PATIENT BELONGINGS
None Quality 130: Documentation Of Current Medications In The Medical Record: Current Medications Documented Detail Level: Detailed Quality 110: Preventive Care And Screening: Influenza Immunization: Influenza Immunization not Administered because Patient Refused.

## 2023-09-07 NOTE — ED ADULT TRIAGE NOTE - TEMPERATURE IN FAHRENHEIT (DEGREES F)
Received an open access colonoscopy referral from Kian Mares MD and patient was scheduled for 10/19/2023.  Patient is also scheduled for a open L2-3, redo L3-4 laminectomy on 09/19/2023.      Message to Dr Mares to review dates and to clarify if the time between procedures is adequate or if Dr Mares would prefer a longer interval between procedures.  Please advise   98.6

## 2025-03-05 NOTE — PATIENT PROFILE ADULT. - CONTRAINDICATIONS & PRECAUTIONS (SELECT ALL THAT APPLY)
Interval History: NAEON. Awaiting placement      Objective:     Vital Signs (Most Recent):  Temp: 98.8 °F (37.1 °C) (06/06/24 1212)  Pulse: 97 (06/06/24 1212)  Resp: 18 (06/06/24 1212)  BP: 120/70 (06/06/24 1212)  SpO2: 99 % (06/06/24 1212) Vital Signs (24h Range):  Temp:  [98.2 °F (36.8 °C)-99.7 °F (37.6 °C)] 98.8 °F (37.1 °C)  Pulse:  [] 97  Resp:  [17-18] 18  SpO2:  [98 %-100 %] 99 %  BP: (108-133)/(66-91) 120/70     Weight: 105.5 kg (232 lb 9.4 oz)  Body mass index is 36.43 kg/m².    Intake/Output Summary (Last 24 hours) at 6/6/2024 1321  Last data filed at 6/6/2024 1000  Gross per 24 hour   Intake 750 ml   Output 2650 ml   Net -1900 ml         Physical Exam  Vitals and nursing note reviewed.   Constitutional:       General: She is not in acute distress.     Appearance: She is not ill-appearing, toxic-appearing or diaphoretic.   HENT:      Head: Normocephalic and atraumatic.   Eyes:      General: No scleral icterus.        Right eye: No discharge.         Left eye: No discharge.      Conjunctiva/sclera: Conjunctivae normal.   Neck:      Comments: Decannulated  Cardiovascular:      Rate and Rhythm: Normal rate and regular rhythm.      Heart sounds: Normal heart sounds.   Pulmonary:      Effort: Pulmonary effort is normal. No respiratory distress.   Abdominal:      General: Abdomen is flat. There is no distension.      Tenderness: There is no abdominal tenderness.      Comments: PEG without signs of inflammation/bleeding     Musculoskeletal:      Right lower leg: No edema.      Left lower leg: No edema.   Skin:     General: Skin is warm and dry.   Neurological:      General: No focal deficit present.      Mental Status: She is alert.      Comments: Nonverbal             Significant Labs: All pertinent labs within the past 24 hours have been reviewed.    Significant Imaging: I have reviewed all pertinent imaging results/findings within the past 24 hours.   149.86 none...

## 2025-03-11 NOTE — PROGRESS NOTE ADULT - SUBJECTIVE AND OBJECTIVE BOX
SURGERY PROGRESS HPI:  Pt seen and examined at bedside. Denies any pain or complaints. Pt tolerating LRD although not eating too much because she doesn't have much of an appetite. Pt denies nausea and vomiting. Pt denies chest pain, SOB, dizziness, fever, chills.     Vital Signs Last 24 Hrs  T(C): 36.5 (26 Sep 2018 23:39), Max: 36.8 (26 Sep 2018 15:00)  T(F): 97.7 (26 Sep 2018 23:39), Max: 98.2 (26 Sep 2018 15:00)  HR: 82 (26 Sep 2018 23:39) (82 - 96)  BP: 135/75 (26 Sep 2018 23:39) (126/70 - 141/82)  RR: 18 (26 Sep 2018 23:39) (18 - 18)  SpO2: 96% (26 Sep 2018 23:39) (96% - 96%)      PHYSICAL EXAM:    GENERAL: NAD  CHEST/LUNG: Clear to ausculation, bilaterally   HEART: RRR S1S2  ABDOMEN: Dressing and packing changed, wound clean and dry, staples intact, no drainage or bleeding. Wound irrigated with NS. Ostomy pink and viable, functioning, liquid and some semi-formed stool in bag. Colostomy bag changed. Nondistended. nontender, no guarding, no rebound.  EXTREMITIES:  calf soft, non tender b/l    I&O's Detail    25 Sep 2018 07:01  -  26 Sep 2018 07:00  --------------------------------------------------------  IN:    dextrose 5%.: 1200 mL    Oral Fluid: 330 mL    Solution: 250 mL    Solution: 200 mL  Total IN: 1980 mL    OUT:    Colostomy: 125 mL  Total OUT: 125 mL    Total NET: 1855 mL    26 Sep 2018 07:01  -  27 Sep 2018 05:25  --------------------------------------------------------  IN:  Total IN: 0 mL    OUT:    Voided: 220 mL  Total OUT: 220 mL  Total NET: -220 mL    LABS:                        10.6   6.82  )-----------( 197      ( 26 Sep 2018 06:39 )             32.6     09-26    142  |  110<H>  |  5<L>  ----------------------------<  100<H>  4.0   |  23  |  0.44<L>    Ca    6.3<LL>      26 Sep 2018 06:39  Phos  1.1     09-25  Mg     1.8     09-25        Impression: 85y Female admitted with Sigmoid perforation (likely perforated diverticulitis), Diffuse purulent peritonitis S/P Exploratory Laparotomy Sigmoid resection with Hartmans procedure, Peritoneal lavage POD #5    Plan:  - Abx per ID  - Cont low residue diet  - Cont PT, increase activity, OOB  - Continue VTE prophylaxis with SQ heparin, venodynes  - Local wound care by surgical team  - Ostomy care, monitor output  - Continue medical management and supportive care  - f/u AM labs  - d/c planning  - will discuss with surgical attending 59

## 2025-03-24 NOTE — ED PROVIDER NOTE - CHIEF COMPLAINT
Medication: Pantoprazole passed protocol.   Last office visit date: 03/06/2025  Next appointment scheduled?: Yes   Number of refills given: 1       pantoprazole (PROTONIX) 20 MG tablet         Sig: Take 1 tablet by mouth daily.    Disp: 90 tablet    Refills: 0    Start: 3/23/2025    Class: Eprescribe    Non-formulary    Last ordered: 4 months ago (11/12/2024) by Shruthi Prajapati MD    Last dispensed: 2/8/2025    Rx #: [id number]    Proton Pump Inhibitor (PPI) Refill Protocol - 12 Month Protocol [id number]/2025 04:17 PM   Protocol Details Seen by prescribing provider or same department within the last 12 months or has a future appt in 3 months - IF FAILED PLEASE LOOK AT CHART REVIEW FOR LAST VISIT AND PROCEED ACCORDINGLY    Patient is not pregnant    Not on Clopidogrel (Plavix) or if on, refill is for Pantoprazole (Protonix)    Medication (including dose and sig) on current meds list        
The patient is a 85y Female complaining of abdominal pain.